# Patient Record
Sex: MALE | Race: ASIAN | NOT HISPANIC OR LATINO | Employment: FULL TIME | URBAN - METROPOLITAN AREA
[De-identification: names, ages, dates, MRNs, and addresses within clinical notes are randomized per-mention and may not be internally consistent; named-entity substitution may affect disease eponyms.]

---

## 2021-05-29 ENCOUNTER — RECORDS - HEALTHEAST (OUTPATIENT)
Dept: ADMINISTRATIVE | Facility: CLINIC | Age: 36
End: 2021-05-29

## 2023-11-09 ENCOUNTER — OFFICE VISIT (OUTPATIENT)
Dept: FAMILY MEDICINE | Facility: CLINIC | Age: 38
End: 2023-11-09
Payer: COMMERCIAL

## 2023-11-09 VITALS
WEIGHT: 147.56 LBS | SYSTOLIC BLOOD PRESSURE: 98 MMHG | BODY MASS INDEX: 27.15 KG/M2 | HEIGHT: 62 IN | DIASTOLIC BLOOD PRESSURE: 68 MMHG | HEART RATE: 80 BPM | OXYGEN SATURATION: 96 % | TEMPERATURE: 97.5 F

## 2023-11-09 DIAGNOSIS — Z71.84 TRAVEL ADVICE ENCOUNTER: Primary | ICD-10-CM

## 2023-11-09 PROCEDURE — 99401 PREV MED CNSL INDIV APPRX 15: CPT | Mod: 25 | Performed by: NURSE PRACTITIONER

## 2023-11-09 PROCEDURE — 90471 IMMUNIZATION ADMIN: CPT | Mod: GA | Performed by: NURSE PRACTITIONER

## 2023-11-09 PROCEDURE — 90716 VAR VACCINE LIVE SUBQ: CPT | Mod: GA | Performed by: NURSE PRACTITIONER

## 2023-11-09 PROCEDURE — 90632 HEPA VACCINE ADULT IM: CPT | Mod: GA | Performed by: NURSE PRACTITIONER

## 2023-11-09 PROCEDURE — 90472 IMMUNIZATION ADMIN EACH ADD: CPT | Mod: GA | Performed by: NURSE PRACTITIONER

## 2023-11-09 PROCEDURE — 90691 TYPHOID VACCINE IM: CPT | Mod: GA | Performed by: NURSE PRACTITIONER

## 2023-11-09 PROCEDURE — 90715 TDAP VACCINE 7 YRS/> IM: CPT | Mod: GA | Performed by: NURSE PRACTITIONER

## 2023-11-09 RX ORDER — AZITHROMYCIN 500 MG/1
500 TABLET, FILM COATED ORAL DAILY
Qty: 3 TABLET | Refills: 0 | Status: SHIPPED | OUTPATIENT
Start: 2023-11-09 | End: 2023-11-12

## 2023-11-09 RX ORDER — ATOVAQUONE AND PROGUANIL HYDROCHLORIDE 250; 100 MG/1; MG/1
1 TABLET, FILM COATED ORAL DAILY
Qty: 35 TABLET | Refills: 0 | Status: SHIPPED | OUTPATIENT
Start: 2023-11-09 | End: 2024-03-01

## 2023-11-09 NOTE — PROGRESS NOTES
"Nurse Note ( Pre-Travel Consult)    Itinerary:  [place] and [place]    Departure Date: 11/14/23    Return Date: 12/13/23    Length of Trip 1 month    Reason for Travel: Visiting friends and relatives         Urban or rural: both    Accommodations: Hotel  Family home        IMMUNIZATION HISTORY  Have you received any immunizations within the past 4 weeks?  No  Have you ever fainted from having your blood drawn or from an injection?  No  Have you ever had a fever reaction to vaccination?  No  Have you ever had any bad reaction or side effect from any vaccination?  No  Have you ever had hepatitis A or B vaccine?  No  Do you live (or work closely) with anyone who has AIDS, an AIDS-like condition, any other immune disorder or who is on chemotherapy for cancer?  No  Do you have a family history of immunodeficiency?  No  Have you received any injection of immune globulin or any blood products during the past 12 months?  No    Patient roomed by Rodolfo Hernandez  Mick Mansfield is a 38 year old male seen today with family members for counsultation for international travel.   Patient will be departing in  5 day(s) and  traveling with family member(s).  of 5     Patient itinerary :  will be in the [facility] for 2 days till 11/19 >  [facility] ([place])  > [facility] on 12/11/2023 region of [facility] which risk for Malaria, Dengue Fever, Rabies, food borne illnesses, motor vehicle accidents, and Typhoid. exposure.      Patient's activities will include visiting friends and relatives.    Patient's country of birth is [facility]    To [place] in 1997  Special medical concerns: none  Pre-travel questionnaire was completed by patient and reviewed by provider.     Vitals: BP 98/68   Pulse 80   Temp 97.5  F (36.4  C) (Temporal)   Ht 1.562 m (5' 1.5\")   Wt 66.9 kg (147 lb 9 oz)   SpO2 96%   BMI 27.43 kg/m    BMI= Body mass index is 27.43 kg/m .    EXAM:  General:  Well-nourished, well-developed in no acute distress.  Appears to be " stated age, interacts appropriately and expresses understanding of information given to patient.    Current Outpatient Medications   Medication Sig Dispense Refill    atovaquone-proguanil (MALARONE) 250-100 MG tablet Take 1 tablet by mouth daily Start 2 days before exposure to Malaria and continue daily till  7 days after exposure. 35 tablet 0    azithromycin (ZITHROMAX) 500 MG tablet Take 1 tablet (500 mg) by mouth daily for 3 doses Take 1 tablet a day for up to 3 days for severe diarrhea 3 tablet 0     There is no problem list on file for this patient.    No Known Allergies      Immunizations discussed include:   Covid 19: Up to date and Declined  booster  Hepatitis A:  Ordered/given today, risks, benefits and side effects reviewed  Hepatitis B: immune  Influenza: Declined  Not concerned about risk of disease  Typhoid: Ordered/given today, risks, benefits and side effects reviewed  Rabies: Declined  reviewed managment of a animal bite or scratch (washing wound, seek medical care within 24 hours for post exposure prophylaxis ) and Insufficient time to vaccinate  Yellow Fever: Not indicated  Japanese Encephalitis: Not indicated - risk of disease is minimal insufficient time  Meningococcus: Not indicated  Tetanus/Diphtheria: Ordered/given today, risks, benefits and side effects reviewed  Measles/Mumps/Rubella: Up to date  Cholera: Not needed  Polio: Up to date  Pneumococcal: Under age of 65  Varicella: Ordered/given today, risks, benefits and side effects reviewed  Shingrix: Not indicated  HPV:  Not indicated     TB: low risk     Altitude Exposure on this trip: no  Past tolerance to Altitude: na    ASSESSMENT/PLAN:  Diagnoses and all orders for this visit:    Travel advice encounter  -     atovaquone-proguanil (MALARONE) 250-100 MG tablet; Take 1 tablet by mouth daily Start 2 days before exposure to Malaria and continue daily till  7 days after exposure.  -     azithromycin (ZITHROMAX) 500 MG tablet; Take 1 tablet  (500 mg) by mouth daily for 3 doses Take 1 tablet a day for up to 3 days for severe diarrhea    Other orders  -     HEPATITIS A 19+ (HAVRIX/VAQTA)  -     TYPHOID VACCINE, IM  -     TDAP 7+ (ADACEL,BOOSTRIX)  -     VARICELLA LIVE (VARIVAX)      I have reviewed general recommendations for safe travel   including: food/water precautions, insect precautions,   roadway safety. Educational materials and [employer] report provided.    Malaraia prophylaxis recommended: Malarone  Symptomatic treatment for traveler's diarrhea: azithromycin      Evacuation insurance advised and resources were provided to patient.    Total visit time 20 minutes  with over 50% of time spent counseling patient and shared decision making as detailed above.    Nisha Beverly CNP  Certificate in Travel Health

## 2023-11-09 NOTE — PATIENT INSTRUCTIONS
Thank you for visiting the [facility] : [phone]  Today November 9, 2023 you received the    Hepatitis A Vaccine - Please return on 5/7/24 or later for your 2nd and final dose.    Tetanus (Tdap) Vaccine    Varicella (Chicken Pox) Vaccine    Typhoid - injectable. This vaccine is valid for two years.     Follow up vaccine appointments can be made as a NURSE ONLY visit at the Travel Clinic, (BE PREPARED TO WAIT, ) or at designated [facility].    If you are receiving the Rabies vaccines series, it is important that you follow the exact schedule ordered.     Pre-travel     We recommend that you purchase Medical Evacuation Insurance prior to your departure.  Https://wwwnc.cdc.gov/travel/page/insurance    [place] your travel plans with the [employer] Department of State through STEP ( Smart Traveler Enrollment Program ) https://step.state.gov.  STEP is a free service to allow [place] citizens and nationals traveling and living abroad to enroll their trip with the nearest [place] Embassy or Consulate.    Animal Exposure: Avoid all mammals even if they look healthy.  If there is a bite, scratch or even a lick, wash area immediately with soap and water for 15 minutes and seek medical care within 24 hours for evaluation of Rabies post exposure treatment.  Contact your Medical Evacuation Insurance.    Repiratory illness prevention strategies ( Covid and Influenza ) CDC recommendations:  Consider wearing a mask in crowded or poorly ventilated indoor areas, including on public transportation and in transportation hubs.  Hand washing: frequent, thorough handwashing with soap and water for 20 seconds. Use an alcohol-based hand  with at least 60% alcohol if soap and water are not readily available. Avoid touching face, nose, eyes, mouth unless you have done appropriate hand washing as above.  VACCINES: Staying up to date on COVID-19 vaccines significantly lowers the risk of getting  very sick, being hospitalized, or dying from COVID-19. CDC recommends that everyone stay up to date on their COVID-19 vaccines, especially people with weakened immune systems.    Travel Covid 19 Testing:  updated 12/06/2021  International travelers: Pre-travel:  See country specific Embassy websites or airline websites.    Post travel: CDC recommends getting tested 3-5 days after your trip     Post-travel illness:  Contact your provider or [facility] if you develop a fever, rash, cough, diarrhea or other symptoms for up to 1 year after travel.  Inform your healthcare provider when and where you traveled to.    Please call the [facility] with any questions [phone]  Or send your provider a 'My Chart' note.

## 2023-11-25 ENCOUNTER — HEALTH MAINTENANCE LETTER (OUTPATIENT)
Age: 38
End: 2023-11-25

## 2024-02-16 ENCOUNTER — HOSPITAL ENCOUNTER (EMERGENCY)
Facility: HOSPITAL | Age: 39
Discharge: HOME | End: 2024-02-16
Attending: EMERGENCY MEDICINE | Admitting: EMERGENCY MEDICINE
Payer: COMMERCIAL

## 2024-02-16 ENCOUNTER — APPOINTMENT (OUTPATIENT)
Dept: CT IMAGING | Facility: HOSPITAL | Age: 39
End: 2024-02-16
Attending: EMERGENCY MEDICINE
Payer: COMMERCIAL

## 2024-02-16 ENCOUNTER — OFFICE VISIT (OUTPATIENT)
Dept: FAMILY MEDICINE | Facility: CLINIC | Age: 39
End: 2024-02-16
Payer: COMMERCIAL

## 2024-02-16 VITALS
HEART RATE: 71 BPM | TEMPERATURE: 98.7 F | OXYGEN SATURATION: 98 % | BODY MASS INDEX: 27.75 KG/M2 | RESPIRATION RATE: 16 BRPM | DIASTOLIC BLOOD PRESSURE: 74 MMHG | SYSTOLIC BLOOD PRESSURE: 114 MMHG | WEIGHT: 149.3 LBS

## 2024-02-16 VITALS
SYSTOLIC BLOOD PRESSURE: 122 MMHG | OXYGEN SATURATION: 99 % | HEART RATE: 72 BPM | RESPIRATION RATE: 17 BRPM | DIASTOLIC BLOOD PRESSURE: 84 MMHG | TEMPERATURE: 96.7 F | HEIGHT: 62 IN | BODY MASS INDEX: 27.42 KG/M2 | WEIGHT: 149 LBS

## 2024-02-16 DIAGNOSIS — R42 DIZZINESS: ICD-10-CM

## 2024-02-16 DIAGNOSIS — R10.13 EPIGASTRIC PAIN: Primary | ICD-10-CM

## 2024-02-16 DIAGNOSIS — R42 LIGHT HEADEDNESS: ICD-10-CM

## 2024-02-16 DIAGNOSIS — R42 FEELING FAINT: ICD-10-CM

## 2024-02-16 LAB
ALBUMIN SERPL BCG-MCNC: 4.5 G/DL (ref 3.5–5.2)
ALP SERPL-CCNC: 72 U/L (ref 40–150)
ALT SERPL W P-5'-P-CCNC: 103 U/L (ref 0–70)
ANION GAP SERPL CALCULATED.3IONS-SCNC: 11 MMOL/L (ref 7–15)
AST SERPL W P-5'-P-CCNC: 44 U/L (ref 0–45)
BASOPHILS # BLD AUTO: 0.1 10E3/UL (ref 0–0.2)
BASOPHILS NFR BLD AUTO: 1 %
BILIRUB SERPL-MCNC: 0.3 MG/DL
BUN SERPL-MCNC: 15.4 MG/DL (ref 6–20)
CALCIUM SERPL-MCNC: 9 MG/DL (ref 8.6–10)
CHLORIDE SERPL-SCNC: 103 MMOL/L (ref 98–107)
CREAT SERPL-MCNC: 0.91 MG/DL (ref 0.67–1.17)
DEPRECATED HCO3 PLAS-SCNC: 27 MMOL/L (ref 22–29)
EGFRCR SERPLBLD CKD-EPI 2021: >90 ML/MIN/1.73M2
EOSINOPHIL # BLD AUTO: 0.2 10E3/UL (ref 0–0.7)
EOSINOPHIL NFR BLD AUTO: 2 %
ERYTHROCYTE [DISTWIDTH] IN BLOOD BY AUTOMATED COUNT: 13.2 % (ref 10–15)
FLUAV RNA SPEC QL NAA+PROBE: NEGATIVE
FLUBV RNA RESP QL NAA+PROBE: NEGATIVE
GLUCOSE SERPL-MCNC: 87 MG/DL (ref 70–99)
GROUP A STREP BY PCR: NOT DETECTED
HCT VFR BLD AUTO: 47.8 % (ref 40–53)
HGB BLD-MCNC: 15.7 G/DL (ref 13.3–17.7)
HOLD SPECIMEN: NORMAL
HOLD SPECIMEN: NORMAL
IMM GRANULOCYTES # BLD: 0.1 10E3/UL
IMM GRANULOCYTES NFR BLD: 1 %
LIPASE SERPL-CCNC: 49 U/L (ref 13–60)
LYMPHOCYTES # BLD AUTO: 4.6 10E3/UL (ref 0.8–5.3)
LYMPHOCYTES NFR BLD AUTO: 47 %
MAGNESIUM SERPL-MCNC: 2.3 MG/DL (ref 1.7–2.3)
MCH RBC QN AUTO: 28.4 PG (ref 26.5–33)
MCHC RBC AUTO-ENTMCNC: 32.8 G/DL (ref 31.5–36.5)
MCV RBC AUTO: 87 FL (ref 78–100)
MONOCYTES # BLD AUTO: 0.7 10E3/UL (ref 0–1.3)
MONOCYTES NFR BLD AUTO: 7 %
NEUTROPHILS # BLD AUTO: 4.1 10E3/UL (ref 1.6–8.3)
NEUTROPHILS NFR BLD AUTO: 42 %
NRBC # BLD AUTO: 0 10E3/UL
NRBC BLD AUTO-RTO: 0 /100
PLATELET # BLD AUTO: 170 10E3/UL (ref 150–450)
POTASSIUM SERPL-SCNC: 3.8 MMOL/L (ref 3.4–5.3)
PROT SERPL-MCNC: 7.8 G/DL (ref 6.4–8.3)
RBC # BLD AUTO: 5.52 10E6/UL (ref 4.4–5.9)
RSV RNA SPEC NAA+PROBE: NEGATIVE
SARS-COV-2 RNA RESP QL NAA+PROBE: NEGATIVE
SODIUM SERPL-SCNC: 141 MMOL/L (ref 135–145)
TROPONIN T SERPL HS-MCNC: <6 NG/L
TSH SERPL DL<=0.005 MIU/L-ACNC: 1.72 UIU/ML (ref 0.3–4.2)
WBC # BLD AUTO: 9.7 10E3/UL (ref 4–11)

## 2024-02-16 PROCEDURE — 83690 ASSAY OF LIPASE: CPT | Performed by: EMERGENCY MEDICINE

## 2024-02-16 PROCEDURE — 70491 CT SOFT TISSUE NECK W/DYE: CPT

## 2024-02-16 PROCEDURE — 87637 SARSCOV2&INF A&B&RSV AMP PRB: CPT | Performed by: EMERGENCY MEDICINE

## 2024-02-16 PROCEDURE — 96361 HYDRATE IV INFUSION ADD-ON: CPT

## 2024-02-16 PROCEDURE — 250N000011 HC RX IP 250 OP 636: Performed by: EMERGENCY MEDICINE

## 2024-02-16 PROCEDURE — 93005 ELECTROCARDIOGRAM TRACING: CPT | Performed by: EMERGENCY MEDICINE

## 2024-02-16 PROCEDURE — 250N000013 HC RX MED GY IP 250 OP 250 PS 637: Performed by: EMERGENCY MEDICINE

## 2024-02-16 PROCEDURE — 84443 ASSAY THYROID STIM HORMONE: CPT | Performed by: EMERGENCY MEDICINE

## 2024-02-16 PROCEDURE — 36415 COLL VENOUS BLD VENIPUNCTURE: CPT | Performed by: EMERGENCY MEDICINE

## 2024-02-16 PROCEDURE — 83735 ASSAY OF MAGNESIUM: CPT | Performed by: EMERGENCY MEDICINE

## 2024-02-16 PROCEDURE — 84484 ASSAY OF TROPONIN QUANT: CPT | Performed by: EMERGENCY MEDICINE

## 2024-02-16 PROCEDURE — 71275 CT ANGIOGRAPHY CHEST: CPT

## 2024-02-16 PROCEDURE — 258N000003 HC RX IP 258 OP 636: Performed by: EMERGENCY MEDICINE

## 2024-02-16 PROCEDURE — 80053 COMPREHEN METABOLIC PANEL: CPT | Performed by: EMERGENCY MEDICINE

## 2024-02-16 PROCEDURE — 99214 OFFICE O/P EST MOD 30 MIN: CPT | Performed by: PHYSICIAN ASSISTANT

## 2024-02-16 PROCEDURE — 87651 STREP A DNA AMP PROBE: CPT | Performed by: EMERGENCY MEDICINE

## 2024-02-16 PROCEDURE — 99285 EMERGENCY DEPT VISIT HI MDM: CPT | Mod: 25

## 2024-02-16 PROCEDURE — 85025 COMPLETE CBC W/AUTO DIFF WBC: CPT | Performed by: EMERGENCY MEDICINE

## 2024-02-16 PROCEDURE — 96374 THER/PROPH/DIAG INJ IV PUSH: CPT | Mod: 59

## 2024-02-16 RX ORDER — ONDANSETRON 2 MG/ML
4 INJECTION INTRAMUSCULAR; INTRAVENOUS ONCE
Status: COMPLETED | OUTPATIENT
Start: 2024-02-16 | End: 2024-02-16

## 2024-02-16 RX ORDER — POLYVINYL ALCOHOL 14 MG/ML
1 SOLUTION/ DROPS OPHTHALMIC PRN
Qty: 30 ML | Refills: 0 | Status: SHIPPED | OUTPATIENT
Start: 2024-02-16 | End: 2024-03-01

## 2024-02-16 RX ORDER — MECLIZINE HCL 12.5 MG 12.5 MG/1
25 TABLET ORAL ONCE
Status: COMPLETED | OUTPATIENT
Start: 2024-02-16 | End: 2024-02-16

## 2024-02-16 RX ORDER — IOPAMIDOL 755 MG/ML
90 INJECTION, SOLUTION INTRAVASCULAR ONCE
Status: COMPLETED | OUTPATIENT
Start: 2024-02-16 | End: 2024-02-16

## 2024-02-16 RX ORDER — MECLIZINE HYDROCHLORIDE 25 MG/1
25 TABLET ORAL 3 TIMES DAILY PRN
Qty: 20 TABLET | Refills: 0 | Status: SHIPPED | OUTPATIENT
Start: 2024-02-16 | End: 2024-03-01

## 2024-02-16 RX ADMIN — MECLIZINE HYDROCHLORIDE 25 MG: 12.5 TABLET ORAL at 20:09

## 2024-02-16 RX ADMIN — ONDANSETRON 4 MG: 2 INJECTION INTRAMUSCULAR; INTRAVENOUS at 20:09

## 2024-02-16 RX ADMIN — IOPAMIDOL 90 ML: 755 INJECTION, SOLUTION INTRAVENOUS at 20:38

## 2024-02-16 RX ADMIN — SODIUM CHLORIDE 1000 ML: 9 INJECTION, SOLUTION INTRAVENOUS at 18:31

## 2024-02-16 ASSESSMENT — ACTIVITIES OF DAILY LIVING (ADL)
ADLS_ACUITY_SCORE: 35
ADLS_ACUITY_SCORE: 35

## 2024-02-16 NOTE — ED TRIAGE NOTES
Patient arrives for evaluation of dizziness. States that he has had about 3-4 weeks of dizziness. States that it seems to be worse after he eats. Reports that when he experiences episodes of dizziness he feels very weak and tired. Was seen in clinic prior to ED, and was told by provider to go to ED for EKG and blood work. Patient also states that provider mentioned that eyes were yellow in color which was also concerning.

## 2024-02-16 NOTE — PROGRESS NOTES
"Patient presents with:  Dizziness: Constant dizziness and lightheaded for last 3 week. Blurry vision. Some vertigo. Head heavy. Slight nausea. Swollen gland. Syncope every time pt eats. Lt eye turn red yesterday. Nose bleed Rt nostril last 2 day.    (R10.13) Epigastric pain  (primary encounter diagnosis)    (R42) Dizziness  Comment: intermittent and with head movement    (R42) Feeling faint  Comment: as if he is going to have a syncopal episode every time he eats.    Plan: to ED now for further evaluation.     We do not have the capacity to get stat comprehensive metabolic panel's here in urgent care to appropriately assess this patient today.  I have referred him to the emergency department.  His wife will drive him.  I contacted the ED to let them know he was on his way.      At the end of the encounter, I discussed results, diagnosis, medications. Discussed red flags for immediate return to clinic/ER, as well as indications for follow up if no improvement. Patient understood and agreed to plan. Patient was stable for discharge         SUBJECTIVE:   Mick Mansfield is a 38 year old male who presents today with multiple issues:  1) Left eye red and crusting for the past 3 days.   2) some blurred vision for the past few months.   3) slight cough worse at night.    4) feels like he is going to black out every time he eats something for the past 3 weeks like his head is really heavy.  He denies any chest pain shortness of breath or palpitations.  He denies any pain with swallowing saliva.  He denies any pain when he is eating, only complains of the feeling like he is going to \"blackout\"  5) both eyes seem yellow for the past month or so. He does drink alcohol, but only 4 beers or so in a weekend.       No abdominal pain or back pain.      Last month he had some epigastric pain that resolved with pepto bismal and pepcid.  He felt like he had air in his stomach and was very bloated but that resolved.      No urinary " symptoms.       He is here with his wife today who helps with the HPI.      Current Outpatient Medications   Medication Sig Dispense Refill    Multiple Vitamins-Iron (DAILY-JOSE/IRON/BETA-CAROTENE) TABS TAKE 1 TABLET BY MOUTH DAILY. (Patient not taking: Reported on 10/19/2020) 30 tablet 7     Social History     Tobacco Use    Smoking status: Never Smoker    Smokeless tobacco: Never Used   Substance Use Topics    Alcohol use: Not on file     Family History   Problem Relation Age of Onset    Diabetes Mother     Diabetes Father          ROS:    10 point ROS of systems including Constitutional, Eyes, Respiratory, Cardiovascular, Gastroenterology, Genitourinary, Integumentary, Muscularskeletal, Psychiatric ,neurological were all negative except for pertinent positives noted in my HPI       OBJECTIVE:  /74   Pulse 71   Temp 98.7  F (37.1  C) (Oral)   Resp 16   Wt 67.7 kg (149 lb 4.8 oz)   SpO2 98%   BMI 27.75 kg/m    Physical Exam:  GENERAL APPEARANCE: healthy, alert and no distress  EYES: EOMI,  PERRL, conjunctiva clear  EYES: left conjunctiva is mildly injected.  Both conjunctivae are mildly icteric  HENT: ear canals and TM's normal.  Nose and mouth without ulcers, erythema or lesions. With boggy blue turbinates and whitish coryza  NECK: supple, nontender, no lymphadenopathy  RESP: lungs clear to auscultation - no rales, rhonchi or wheezes  CV: regular rates and rhythm, normal S1 S2, no murmur noted  ABDOMEN:  soft, nontender, no HSM or masses and bowel sounds normal  NEURO: Normal strength and tone, sensory exam grossly normal,  normal speech and mentation  SKIN: no suspicious lesions or rashes

## 2024-02-16 NOTE — PATIENT INSTRUCTIONS
(R10.13) Epigastric pain  (primary encounter diagnosis)    (R42) Dizziness  Comment: intermittent and with head movement    (R42) Feeling faint  Comment: as if he is going to have a syncopal episode every time he eats.    Plan: to ED now for further evaluation.

## 2024-02-17 LAB
ATRIAL RATE - MUSE: 70 BPM
DIASTOLIC BLOOD PRESSURE - MUSE: NORMAL MMHG
INTERPRETATION ECG - MUSE: NORMAL
P AXIS - MUSE: 63 DEGREES
PR INTERVAL - MUSE: 170 MS
QRS DURATION - MUSE: 88 MS
QT - MUSE: 372 MS
QTC - MUSE: 401 MS
R AXIS - MUSE: -2 DEGREES
SYSTOLIC BLOOD PRESSURE - MUSE: NORMAL MMHG
T AXIS - MUSE: 8 DEGREES
VENTRICULAR RATE- MUSE: 70 BPM

## 2024-02-17 NOTE — DISCHARGE INSTRUCTIONS
Follow-up with your primary care doctor if not improving.  Can take the Antivert as needed when you are feeling dizzy however I do feel this is more likely to be lightheadedness with this may not significantly improve your symptoms but you can try this.  Stay well-hydrated.  As we discussed can try Gatorade to stay well-hydrated.  Can try the artificial tears to keep your dry eyes more comfortable.  Also can use warm compresses on the eyes to stimulate the glands to help keep your eyes hydrated.  Can do the warm compresses 4 times a day.    Return to the ER for any new or worsening concerns.

## 2024-02-17 NOTE — ED PROVIDER NOTES
EMERGENCY DEPARTMENT ENCOUNTER      NAME: Mick Mansfield  AGE: 38 year old male  YOB: 1985  MRN: [id number]  EVALUATION DATE & TIME: No admission date for patient encounter.    PCP: No Ref-Primary, Physician    ED PROVIDER: Jessie Vega MD      Chief Complaint   Patient presents with    Dizziness         FINAL IMPRESSION:  1. Light headedness          ED COURSE & MEDICAL DECISION MAKING:    Pertinent Labs & Imaging studies reviewed. (See chart for details)    6:50 PM I introduced myself to the patient, obtained patient history, performed a physical exam, and discussed plan for ED workup including potential diagnostic laboratory/imaging studies and interventions.  9:50 PM I rechecked and updated the patient. He underwent an ambulatory trial. We discussed the plan for discharge and the patient is agreeable. Reviewed supportive cares, symptomatic treatment, outpatient follow up, and reasons to return to the Emergency Department. Patient to be discharged by ED RN.      38 year old male presents to the Emergency Department for evaluation of lightheadedness/dizziness.  This has been ongoing now for 3 weeks.  It also sounds like he has had some upper respiratory symptoms with this.  Has multiple symptoms noted as below.  No chest pain or shortness of breath however.  Also had recent long travel to [facility] in December and drove a car to [place] a couple weeks ago.  His children have also been sick with upper respiratory illness at home.  Somewhat difficult to tie all of his symptoms together.  Overall on exam he is well-appearing and in no acute distress.  No signs of respiratory distress.  He has no focal neurologic deficits or signs of cerebellar dysfunction on exam.  He has a hard time describing his symptoms but states mostly it feels like he is going to pass out.  May be at times if he turns his head too quickly or stands up too quickly he will get a little room spinning also.  States that he has been  feeling very fatigued and like his head is heavy when he eats.  Not specifically passing out when eating or anything like that.  When I press him further he really states that it is actually he feels like his head is heavy while he eats.  Vague symptoms somewhat difficult to totally pin down.  However with the recent long travel did consider PE.  He is also reportedly had some chronic neck gland swelling per his wife that is been ongoing for months that he has not been evaluated for.  I can palpate some cervical lymphadenopathy.  Concerning for potential mass or whether this could also just be reactive if he does have a possible viral illness with his multiple exposures and other symptoms.  Overall felt that CVA would be very unlikely as this has been ongoing for 3 weeks and he has no focal deficits or cerebellar deficit on exam here and has been ambulating.  Also is not having any headache or focal deficit to suggest intracranial mass or hemorrhage at this point.  Also considered cardiac etiology or arrhythmia.  Again could be viral syndrome or possible strep pharyngitis with exposure at home.  No signs or symptoms of meningitis or encephalitis on exam.  No meningismus.    Had a long discussion with the patient and his wife about options for workup.  We discussed with the persistence of this over 3 weeks the best imaging study of the head to fully rule things out would be an MRI of the brain and MRA of the head and neck.  At this point they declined this and would prefer to follow through with further workup and symptomatic treatment here and reassess.  Did overall feel that intracranial pathology was less likely with his description.  Will obtain CT soft tissue neck with the lymphadenopathy as well as CT of the chest to rule out PE.  They were comfortable with this plan and laboratory studies.  He was given a liter of IV fluids as well as 4 mg of IV Zofran.  Although this does not sound specifically like vertigo  for most of the time he is experiencing this we did attempt 25 mg of oral meclizine as well to see if this improved his symptoms.  Vital signs here are within normal limits.    Influenza COVID-19 and RSV PCR is negative.  Also possible this could be a post-COVID syndrome since they have had a lot of respiratory disease going around her house.  Group A strep is negative.  TSH within normal limits.  Magnesium within normal limits.  Lipase within normal limits.  Troponin less than 6 and EKG was obtained that does not reveal any evidence of acute ischemia.  This reveals sinus rhythm with no sign of arrhythmia on monitoring here as well.  No AV block.  No sign of WPW, Brugada syndrome, or LVH.  White blood cell count within normal limits at 9.7.  Hemoglobin 15.7.  No signs or symptoms of GI bleeding.  Has no abdominal tenderness or pain at this time.  CMP reveals normal electrolytes and normal creatinine.  Alk phos AST and bilirubin within normal limits.  ALT slightly elevated at 103 which is nonspecific.  Does drink alcohol on the weekends and advised avoiding doing this with his current symptoms.    CT soft tissue neck reveals a mild prominence of the cervical chain lymph nodes bilaterally and overall diffuse manner.  However none of these are pathologically enlarged by size criteria.  This prominence is potentially reactive in nature.  Clinical surveillance of this palpable lymphadenopathy to demonstrate stability or possibility dilution would be of benefit.  Did discuss continued follow-up with his primary care doctor to monitor this with his wife.  He does have a clinic he can go to.  CT of the chest reveals no PE.  No other acute pathology.  No pneumonia.  On reevaluation he is feeling improved.  Ambulated with him in the department here and he has no lightheadedness/dizziness and no ataxia and is feeling well.  Would like to be discharged at this time.  We again discussed potentially obtaining MRI of the brain  which he and his wife declined currently and were comfortable following up as an outpatient for this testing if not improving at home.  He is going to make an appointment and follow-up closely with his primary care doctor per wife.  He was given a prescription for meclizine to try at home although again I feel that vertigo is somewhat less likely but could be possible especially with recent viral illness.  He has this very mild injected conjunctiva bilaterally and does report dry eye history and staring at a computer screen for long periods of time so do wonder if potentially may have some blepharitis.  No significant sign of conjunctivitis and no vision changes here.  Has had chronic worsening vision over about a year that is unchanged.  Again advised close follow-up with his primary care doctor and prescribed artificial tears here to try first as well as warm compresses.  Did discuss if worsening would need reevaluation here in the ER.  They voiced understanding and were comfortable with this plan.  He has had no head trauma and again no focal deficits.  At this point discussed strict return precautions to the ER.  They voiced understanding and were comfortable with the plan of discharge.  He was discharged home in stable condition.  Advised again very important he follow-up with his primary care doctor as we do not know the exact cause of his symptoms.         At the conclusion of the encounter I discussed the results of all of the tests and the disposition. The questions were answered. The patient or family acknowledged understanding and was agreeable with the care plan.         Medical Decision Making  Obtained supplemental history:Supplemental history obtained?: Family Member/Significant Other  Reviewed external records: External records reviewed?: Outpatient Record: [facility] on 2/16/24  Care impacted by chronic illness:Hyperlipidemia  Care significantly affected by social  determinants of health:Access to Medical Care  Did you consider but not order tests?: In addition to work-up documented, I considered the following work up: MRI of the brain including MRA of the head and neck however patient declined this  Did you interpret images independently?: Independent interpretation of ECG and images noted in documentation, when applicable.  Consultation discussion with other provider:Did you involve another provider (consultant, , pharmacy, etc.)?: No  Discharge. I prescribed additional prescription strength medication(s) as charted. See documentation for any additional details.      MEDICATIONS GIVEN IN THE EMERGENCY:  Medications   sodium chloride 0.9% BOLUS 1,000 mL (0 mLs Intravenous Stopped 2/16/24 1948)   meclizine (ANTIVERT) tablet 25 mg (25 mg Oral $Given 2/16/24 2009)   ondansetron (ZOFRAN) injection 4 mg (4 mg Intravenous $Given 2/16/24 2009)   iopamidol (ISOVUE-370) solution 90 mL (90 mLs Intravenous $Given 2/16/24 2038)       NEW PRESCRIPTIONS STARTED AT TODAY'S ER VISIT  Discharge Medication List as of 2/16/2024 10:15 PM        START taking these medications    Details   meclizine (ANTIVERT) 25 MG tablet Take 1 tablet (25 mg) by mouth 3 times daily as needed for dizziness, Disp-20 tablet, R-0, E-Prescribe      polyvinyl alcohol (LIQUIFILM TEARS) 1.4 % ophthalmic solution Apply 1 drop to eye as needed for dry eyes, Disp-30 mL, R-0, E-Prescribe                =================================================================    HPI    Patient information was obtained from: Patient and patient's wife    Use of : N/A         Mick Mansfield is a 38 year old male with a pertinent history of hyperlipidemia (not on any medication) who presents to this ED for evaluation of light headedness/dizziness.    Patient reports constant dizziness/light headed, ongoing for 3 weeks. He reports at times it feels like room spinning but most of the time it is feeling like he may pass out and  worse when standing or moving. He says the light headedness gets worse when eating and he gets near syncopal. Patient denies any loss of conciousness or falls. He also notes head heaviness, fatigue, nausea, and cough that is worse at night that started at the same time. Patient has also had some redness to eyes, sore throat, and has been waking up with nosebleeds for the past couple days. He reports some bloating and acid reflux that is not new. Patient and wife endorses taking a road trip to [place] around 2 weeks ago, but say the symptoms started prior to that. They also endorse flying to [facility] in December. Patient has a history of high cholesterol but denies being on medications for it. Patient's wife endorses that they have kids at home who have been sick with strep and colds lately. Otherwise, patient denies any pain, melena, hematemesis, hematochezia,  rhinorrhea, shortness of breath, diarrhea, chest pain, vomiting, headache, leg swelling, abdominal pain, urinary problems, fevers, new vision changes, new ringing in ears, kids having mono at home, or any family or personal history of heart problems. He has had chronic neck gland swelling for the past couple of months per wife. Also has had blurry vision for 1 year that is unchanged. No numbness, tingling, focal weakness. No family history of cardiac disease or stroke. Has not been evaluated for the neck gland swelling per wife. No headaches. Drinks alcohol on the weekends but not daily. Swallowing normally, head just feels heavy when he tries to eat. No known cardiac disease. No family history of sudden death.  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  Diabetes, hypertension, or tobacco use.    Per chart review, patient was seen at [facility] on 2/16/24 for dizziness. Patient was sent to ED immediately for CMP and further evaluation.    REVIEW OF SYSTEMS   Review of Systems   Pertinent positives and negatives are  "documented in the HPI. All other systems reviewed and are negative.      PAST MEDICAL HISTORY:  History reviewed. No pertinent past medical history.    PAST SURGICAL HISTORY:  History reviewed. No pertinent surgical history.        CURRENT MEDICATIONS:    meclizine (ANTIVERT) 25 MG tablet  polyvinyl alcohol (LIQUIFILM TEARS) 1.4 % ophthalmic solution  atovaquone-proguanil (MALARONE) 250-100 MG tablet        ALLERGIES:  No Known Allergies    FAMILY HISTORY:  History reviewed. No pertinent family history.    SOCIAL HISTORY:   Social History     Socioeconomic History    Marital status: Single   Tobacco Use    Smoking status: Every Day     Types: Cigarettes    Smokeless tobacco: Never       VITALS:  /84   Pulse 72   Temp (!) 96.7  F (35.9  C)   Resp 17   Ht 1.575 m (5' 2\")   Wt 67.6 kg (149 lb)   SpO2 99%   BMI 27.25 kg/m      PHYSICAL EXAM    Physical Exam  Constitutional: Well developed, Well nourished, NAD, GCS 15  HENT: Normocephalic, Atraumatic, Bilateral external ears normal, possible slight bilateral effusions but no erythema or bulging of the TMs, hearing normal, oropharynx normal, uvula is midline and there is no posterior oropharynx swelling, no tonsillar exudate, tolerating secretions without difficulty, no trismus, voice is normal.  Mucous membranes moist, Nose normal. Neck-  Normal range of motion, No tenderness, Supple, No stridor.    Eyes: PERRL, EOMI, slightly injected conjunctiva bilaterally but very minimal, No discharge.  No surrounding periorbital swelling or erythema.  Vision grossly normal.  Normal color vision.  Respiratory: Normal breath sounds, No respiratory distress, No wheezing or crackles, Speaks in full sentences easily.    Cardiovascular: Normal heart rate, Regular rhythm, No murmurs, No rubs, No gallops. 2+ radial pulses bilaterally.   GI: Bowel sounds normal, Soft, No tenderness, No masses, No rebound or guarding.   Musculoskeletal: 2+ DP pulses. No notable lower extremity " edema.  No cyanosis, No clubbing. Good range of motion in all major joints. No tenderness to palpation or major deformities noted. No tenderness of the CTLS spine.    Integument: Warm, Dry, No erythema, No rash. No petechiae.    Lymphatic: Possible slight lymphadenopathy of the cervical chains bilaterally.  Neurologic: Alert & oriented x 3, 5/5 strength in all 4 extremities bilaterally. Sensation intact to light touch in all 4 extremities and the face bilaterally. No focal deficits noted. Normal gait. CN 2-12 intact bilaterally. Vision feeling normal.  Finger-to-nose intact bilaterally.  No ataxia.  No pronator drift.  Psychiatric: Affect normal, Judgment normal, Mood normal. Cooperative.      LAB:  All pertinent labs reviewed and interpreted.  Results for orders placed or performed during the hospital encounter of 02/16/24   CT Chest Pulmonary Embolism w Contrast    Impression    IMPRESSION:  1.  No pulmonary embolism.   Soft tissue neck CT w contrast    Impression    IMPRESSION:   1.  There is a mild prominence of the cervical chain lymph nodes bilaterally in an overall diffuse manner. However, none of these are pathologically enlarged by size criteria. This prominence is potentially reactive in nature. Clinical surveillance of   this palpable lymphadenopathy to demonstrate stability or possible resolution would be of benefit.    Comprehensive metabolic panel   Result Value Ref Range    Sodium 141 135 - 145 mmol/L    Potassium 3.8 3.4 - 5.3 mmol/L    Carbon Dioxide (CO2) 27 22 - 29 mmol/L    Anion Gap 11 7 - 15 mmol/L    Urea Nitrogen 15.4 6.0 - 20.0 mg/dL    Creatinine 0.91 0.67 - 1.17 mg/dL    GFR Estimate >90 >60 mL/min/1.73m2    Calcium 9.0 8.6 - 10.0 mg/dL    Chloride 103 98 - 107 mmol/L    Glucose 87 70 - 99 mg/dL    Alkaline Phosphatase 72 40 - 150 U/L    AST 44 0 - 45 U/L     (H) 0 - 70 U/L    Protein Total 7.8 6.4 - 8.3 g/dL    Albumin 4.5 3.5 - 5.2 g/dL    Bilirubin Total 0.3 <=1.2 mg/dL   Result  Value Ref Range    Magnesium 2.3 1.7 - 2.3 mg/dL   Result Value Ref Range    Lipase 49 13 - 60 U/L   TSH with free T4 reflex   Result Value Ref Range    TSH 1.72 0.30 - 4.20 uIU/mL   Result Value Ref Range    Troponin T, High Sensitivity <6 <=22 ng/L   CBC with platelets and differential   Result Value Ref Range    WBC Count 9.7 4.0 - 11.0 10e3/uL    RBC Count 5.52 4.40 - 5.90 10e6/uL    Hemoglobin 15.7 13.3 - 17.7 g/dL    Hematocrit 47.8 40.0 - 53.0 %    MCV 87 78 - 100 fL    MCH 28.4 26.5 - 33.0 pg    MCHC 32.8 31.5 - 36.5 g/dL    RDW 13.2 10.0 - 15.0 %    Platelet Count 170 150 - 450 10e3/uL    % Neutrophils 42 %    % Lymphocytes 47 %    % Monocytes 7 %    % Eosinophils 2 %    % Basophils 1 %    % Immature Granulocytes 1 %    NRBCs per 100 WBC 0 <1 /100    Absolute Neutrophils 4.1 1.6 - 8.3 10e3/uL    Absolute Lymphocytes 4.6 0.8 - 5.3 10e3/uL    Absolute Monocytes 0.7 0.0 - 1.3 10e3/uL    Absolute Eosinophils 0.2 0.0 - 0.7 10e3/uL    Absolute Basophils 0.1 0.0 - 0.2 10e3/uL    Absolute Immature Granulocytes 0.1 <=0.4 10e3/uL    Absolute NRBCs 0.0 10e3/uL   Extra Blue Top Tube   Result Value Ref Range    Hold Specimen JIC    Extra Red Top Tube   Result Value Ref Range    Hold Specimen JI    Symptomatic Influenza A/B, RSV, & SARS-CoV2 PCR (COVID-19) Nasopharyngeal    Specimen: Nasopharyngeal; Swab   Result Value Ref Range    Influenza A PCR Negative Negative    Influenza B PCR Negative Negative    RSV PCR Negative Negative    SARS CoV2 PCR Negative Negative   ECG 12-LEAD WITH MUSE (LHE)   Result Value Ref Range    Systolic Blood Pressure  mmHg    Diastolic Blood Pressure  mmHg    Ventricular Rate 70 BPM    Atrial Rate 70 BPM    VT Interval 170 ms    QRS Duration 88 ms     ms    QTc 401 ms    P Axis 63 degrees    R AXIS -2 degrees    T Axis 8 degrees    Interpretation ECG       Sinus rhythm  Normal ECG  No previous ECGs available  Confirmed by SEE ED PROVIDER NOTE FOR, ECG INTERPRETATION ([id number]),   ALYSHARAYNAKIRAN ([id number]) on 2/17/2024 12:23:52 AM     Group A Streptococcus PCR Throat Swab    Specimen: Throat; Swab   Result Value Ref Range    Group A strep by PCR Not Detected Not Detected       RADIOLOGY:  Reviewed all pertinent imaging. Please see official radiology report.  Soft tissue neck CT w contrast   Final Result   IMPRESSION:    1.  There is a mild prominence of the cervical chain lymph nodes bilaterally in an overall diffuse manner. However, none of these are pathologically enlarged by size criteria. This prominence is potentially reactive in nature. Clinical surveillance of    this palpable lymphadenopathy to demonstrate stability or possible resolution would be of benefit.       CT Chest Pulmonary Embolism w Contrast   Final Result   IMPRESSION:   1.  No pulmonary embolism.          EKG:    Performed at: [facility] Emergency Department on 2/16/24 at 17:56:23    Impression: sinus rhythm. No evidence of acute ischemia. No sign of WPW, Brugada syndrome, or LVH.     Rate: 70  Rhythm: sinus rhythm  Axis: -2  SC Interval: 170  QRS Interval: 88  QTc Interval: 401  ST Changes: none  Comparison: no previous ECGs available    I have independently reviewed and interpreted the EKG(s) documented above.    PROCEDURES:   none      [facility] Documentation:   CMS Diagnoses:               I, Gissell Watkins, am serving as a scribe to document services personally performed by Jessie Vega MD based on my observation and the provider's statements to me. I, Jessie Vega MD, attest that Gissell Watkins is acting in a scribe capacity, has observed my performance of the services and has documented them in accordance with my direction.    Jessie Vega MD  [facility] EMERGENCY DEPARTMENT  [address] [place] [id number]  [phone]     Jessie Vega MD  02/19/24 [id number]

## 2024-03-01 ENCOUNTER — OFFICE VISIT (OUTPATIENT)
Dept: INTERNAL MEDICINE | Facility: CLINIC | Age: 39
End: 2024-03-01
Payer: COMMERCIAL

## 2024-03-01 VITALS
HEART RATE: 94 BPM | TEMPERATURE: 98 F | SYSTOLIC BLOOD PRESSURE: 110 MMHG | OXYGEN SATURATION: 98 % | RESPIRATION RATE: 16 BRPM | BODY MASS INDEX: 27.92 KG/M2 | DIASTOLIC BLOOD PRESSURE: 70 MMHG | WEIGHT: 151.7 LBS | HEIGHT: 62 IN

## 2024-03-01 DIAGNOSIS — R42 DIZZINESS: ICD-10-CM

## 2024-03-01 DIAGNOSIS — R10.13 EPIGASTRIC PAIN: Primary | ICD-10-CM

## 2024-03-01 DIAGNOSIS — E78.5 HYPERLIPIDEMIA WITH TARGET LDL LESS THAN 130: ICD-10-CM

## 2024-03-01 PROCEDURE — 99213 OFFICE O/P EST LOW 20 MIN: CPT | Performed by: INTERNAL MEDICINE

## 2024-03-01 NOTE — ASSESSMENT & PLAN NOTE
Unclear etiology to his dizziness.  Is interesting that he only notes that specifically when he is eating and not when he is up and around.  Of note, he drinks very little water or fluids throughout the day. CT PE, neck CT, labs and EKG in ED were all benign 2/16/24.   -Increase fluid intake to 64 to 80 ounces a day  -Some of these should be an electrolyte drink  -F/up 6-8 weeks

## 2024-03-01 NOTE — PATIENT INSTRUCTIONS
Drink more fluids, at least 64 ounces, 80 ounces is goal. Mix in electrolyte drinks for some this (gatorade, powerade, liquid IV powder).     Bring stool test for H pylori. Then start omeprazole 20 mg daily.

## 2024-03-01 NOTE — ASSESSMENT & PLAN NOTE
Patient describes a month or so of epigastric pain.  Does have slight tenderness to palpation in the area on exam.  Question whether there might be some gastritis.  -Will have him give us an H. pylori sample  -After submitting sample, start omeprazole 20 mg daily  -Follow-up in 6 to 8 weeks

## 2024-03-01 NOTE — PROGRESS NOTES
"  Assessment & Plan   Problem List Items Addressed This Visit          Nervous and Auditory    Epigastric pain - Primary     Patient describes a month or so of epigastric pain.  Does have slight tenderness to palpation in the area on exam.  Question whether there might be some gastritis.  -Will have him give us an H. pylori sample  -After submitting sample, start omeprazole 20 mg daily  -Follow-up in 6 to 8 weeks         Relevant Medications    omeprazole (PRILOSEC) 20 MG DR capsule    Other Relevant Orders    Helicobacter pylori Antigen Stool       Endocrine    Hyperlipidemia with target LDL less than 130     Wife also tells me that they had biometric screening recently that did show high cholesterol.  She will bring these results to next visit.            Other    Dizziness     Unclear etiology to his dizziness.  Is interesting that he only notes that specifically when he is eating and not when he is up and around.  Of note, he drinks very little water or fluids throughout the day. CT PE, neck CT, labs and EKG in ED were all benign 2/16/24.   -Increase fluid intake to 64 to 80 ounces a day  -Some of these should be an electrolyte drink  -F/up 6-8 weeks              Ordering of each unique test  Prescription drug management  I spent a total of 24 minutes on the day of the visit.   Time spent by me doing chart review, history and exam, documentation and further activities per the note     Nicotine/Tobacco Cessation  He reports that he has been smoking cigarettes. He has never used smokeless tobacco.  Nicotine/Tobacco Cessation Plan  Information offered: Patient not interested at this time      BMI  Estimated body mass index is 27.75 kg/m  as calculated from the following:    Height as of this encounter: 1.575 m (5' 2\").    Weight as of this encounter: 68.8 kg (151 lb 11.2 oz).   Weight management plan: Discussed healthy diet and exercise guidelines    FUTURE APPOINTMENTS:       - Follow-up visit in 2 months for " physical       Subjective   Mick is a 38 year old, presenting for the following health issues:  Ear Problem (Pain and swelling on both ears.) and Follow Up (Dizziness )        3/1/2024     2:39 PM   Additional Questions   Roomed by Jj DEL TORO   Accompanied by Wife and son     Patient is here with his wife.  They would like to discuss his intermittent swelling behind his ears as well as follow-up after he was in the emergency room on 2/16/24.  That day had initially presented to urgent care with a myriad of complaints including dizziness, lightheadedness, some upper respiratory symptoms and upper abdominal pain and was directed to the emergency room for more expedited workup.  While there he had very thorough workup that was largely unremarkable.  Labs including CBC, CBC, magnesium, lipase, TSH, troponin and viral swabs were all normal or negative aside from elevated ALT of 103.  CT PE was negative for PE or any acute findings in his chest.  He also got a CT of his neck which did show mild prominence of the cervical chain lymph nodes bilaterally and an overall diffuse manner, none were pathologically enlarged, thought to be potentially reactive in nature.  Radiologist noted clinical surveillance of the palpable lymphadenopathy to demonstrate stability or resolution would be of benefit/recommended.    Today, he tells me he has had the intermittent swelling behind his ears for 2 years.  Notices it swells when he has less sleep or an upper respiratory illness and then gets better after that.  Since he was in the emergency room, he does think the swelling has improved.  If painful, Tylenol helps.    Still having dizziness and lightheadedness, specifically when he is eating or drinking. No falls. Only drinks 24 ounces of water most days. Wife tells me that they had biometric testing done recently that showed pre-diabetes and high cholesterol.     Also having intermittent upper abdominal discomfort. He says when he eats the  "food feels like it just sits in stomach. No sharp pain but does have discomfort when he presses in epigastric region.       Review of Systems  Constitutional, neuro, ENT, endocrine, pulmonary, cardiac, gastrointestinal, genitourinary, musculoskeletal, integument and psychiatric systems are negative, except as otherwise noted.      Objective    /70   Pulse 94   Temp 98  F (36.7  C) (Oral)   Resp 16   Ht 1.575 m (5' 2\")   Wt 68.8 kg (151 lb 11.2 oz)   SpO2 98%   BMI 27.75 kg/m    Body mass index is 27.75 kg/m .  Physical Exam   GENERAL: alert and no distress  EYES: Eyes grossly normal to inspection, PERRL and conjunctivae and sclerae normal  HENT: ear canals and TM's normal, nose and mouth without ulcers or lesions  NECK: mild, nontender symmetric cervical LAD, no asymmetry, masses, or scars  RESP: lungs clear to auscultation - no rales, rhonchi or wheezes  CV: regular rate and rhythm, normal S1 S2, no S3 or S4, no murmur, click or rub, no peripheral edema  ABDOMEN: soft, mild TTP in epigastric region, no hepatosplenomegaly, no masses and bowel sounds normal  MS: no gross musculoskeletal defects noted, no edema  SKIN: no suspicious lesions or rashes  NEURO: Normal strength and tone, mentation intact and speech normal  PSYCH: mentation appears normal, affect normal/bright    Admission on 02/16/2024, Discharged on 02/16/2024   Component Date Value Ref Range Status    Ventricular Rate 02/16/2024 70  BPM Final    Atrial Rate 02/16/2024 70  BPM Final    WV Interval 02/16/2024 170  ms Final    QRS Duration 02/16/2024 88  ms Final    QT 02/16/2024 372  ms Final    QTc 02/16/2024 401  ms Final    P Axis 02/16/2024 63  degrees Final    R AXIS 02/16/2024 -2  degrees Final    T [place] 02/16/2024 8  degrees Final    Interpretation ECG 02/16/2024    Final                    Value:Sinus rhythm  Normal ECG  No previous ECGs available  Confirmed by SEE ED PROVIDER NOTE FOR, ECG INTERPRETATION ([id number]),  ALYSHA, " KIRAN ([id number]) on 2/17/2024 12:23:52 AM      Sodium 02/16/2024 141  135 - 145 mmol/L Final    Reference intervals for this test were updated on 09/26/2023 to more accurately reflect our healthy population. There may be differences in the flagging of prior results with similar values performed with this method. Interpretation of those prior results can be made in the context of the updated reference intervals.     Potassium 02/16/2024 3.8  3.4 - 5.3 mmol/L Final    Carbon Dioxide (CO2) 02/16/2024 27  22 - 29 mmol/L Final    Anion Gap 02/16/2024 11  7 - 15 mmol/L Final    Urea Nitrogen 02/16/2024 15.4  6.0 - 20.0 mg/dL Final    Creatinine 02/16/2024 0.91  0.67 - 1.17 mg/dL Final    GFR Estimate 02/16/2024 >90  >60 mL/min/1.73m2 Final    Calcium 02/16/2024 9.0  8.6 - 10.0 mg/dL Final    Chloride 02/16/2024 103  98 - 107 mmol/L Final    Glucose 02/16/2024 87  70 - 99 mg/dL Final    Alkaline Phosphatase 02/16/2024 72  40 - 150 U/L Final    Reference intervals for this test were updated on 11/14/2023 to more accurately reflect our healthy population. There may be differences in the flagging of prior results with similar values performed with this method. Interpretation of those prior results can be made in the context of the updated reference intervals.    AST 02/16/2024 44  0 - 45 U/L Final    Reference intervals for this test were updated on 6/12/2023 to more accurately reflect our healthy population. There may be differences in the flagging of prior results with similar values performed with this method. Interpretation of those prior results can be made in the context of the updated reference intervals.    ALT 02/16/2024 103 (H)  0 - 70 U/L Final    Reference intervals for this test were updated on 6/12/2023 to more accurately reflect our healthy population. There may be differences in the flagging of prior results with similar values performed with this method. Interpretation of those prior results can be made in the  context of the updated reference intervals.      Protein Total 02/16/2024 7.8  6.4 - 8.3 g/dL Final    Albumin 02/16/2024 4.5  3.5 - 5.2 g/dL Final    Bilirubin Total 02/16/2024 0.3  <=1.2 mg/dL Final    Magnesium 02/16/2024 2.3  1.7 - 2.3 mg/dL Final    Lipase 02/16/2024 49  13 - 60 U/L Final    TSH 02/16/2024 1.72  0.30 - 4.20 uIU/mL Final    Troponin T, High Sensitivity 02/16/2024 <6  <=22 ng/L Final    Either a High Sensitivity Troponin T baseline (0 hours) value = 100 ng/L, or an increase in High Sensitivity Troponin T = 7 ng/L at 2 hours compared to 0 hours (2-0 hours), suggests myocardial injury, and urgent clinical attention is required.    If the 2-0 hours increase is <7 ng/L, a High Sensitivity Troponin T result above gender-specific reference ranges warrants further evaluation.   Recommendations for further evaluation include correlation with clinical decision-making tool (e.g., HEART), a 3rd High Sensitivity Troponin T test 2 hours after the 2nd (a 20% change from baseline would represent concern), admission for observation, close PCC/cardiology follow-up, or urgent outpatient provocative testing.    WBC Count 02/16/2024 9.7  4.0 - 11.0 10e3/uL Final    RBC Count 02/16/2024 5.52  4.40 - 5.90 10e6/uL Final    Hemoglobin 02/16/2024 15.7  13.3 - 17.7 g/dL Final    Hematocrit 02/16/2024 47.8  40.0 - 53.0 % Final    MCV 02/16/2024 87  78 - 100 fL Final    MCH 02/16/2024 28.4  26.5 - 33.0 pg Final    MCHC 02/16/2024 32.8  31.5 - 36.5 g/dL Final    RDW 02/16/2024 13.2  10.0 - 15.0 % Final    Platelet Count 02/16/2024 170  150 - 450 10e3/uL Final    % Neutrophils 02/16/2024 42  % Final    % Lymphocytes 02/16/2024 47  % Final    % Monocytes 02/16/2024 7  % Final    % Eosinophils 02/16/2024 2  % Final    % Basophils 02/16/2024 1  % Final    % Immature Granulocytes 02/16/2024 1  % Final    NRBCs per 100 WBC 02/16/2024 0  <1 /100 Final    Absolute Neutrophils 02/16/2024 4.1  1.6 - 8.3 10e3/uL Final    Absolute  Lymphocytes 02/16/2024 4.6  0.8 - 5.3 10e3/uL Final    Absolute Monocytes 02/16/2024 0.7  0.0 - 1.3 10e3/uL Final    Absolute Eosinophils 02/16/2024 0.2  0.0 - 0.7 10e3/uL Final    Absolute Basophils 02/16/2024 0.1  0.0 - 0.2 10e3/uL Final    Absolute Immature Granulocytes 02/16/2024 0.1  <=0.4 10e3/uL Final    Absolute NRBCs 02/16/2024 0.0  10e3/uL Final    Hold Specimen 02/16/2024 JIC   Final    Hold Specimen 02/16/2024 JIC   Final    Influenza A PCR 02/16/2024 Negative  Negative Final    Influenza B PCR 02/16/2024 Negative  Negative Final    RSV PCR 02/16/2024 Negative  Negative Final    SARS CoV2 PCR 02/16/2024 Negative  Negative Final    NEGATIVE: SARS-CoV-2 (COVID-19) RNA not detected, presumed negative.    Group A strep by PCR 02/16/2024 Not Detected  Not Detected Final           Signed Electronically by: Carri iLvingston MD

## 2024-03-01 NOTE — ASSESSMENT & PLAN NOTE
Wife also tells me that they had biometric screening recently that did show high cholesterol.  She will bring these results to next visit.

## 2024-03-04 DIAGNOSIS — R10.13 EPIGASTRIC PAIN: ICD-10-CM

## 2024-06-01 ENCOUNTER — E-VISIT (OUTPATIENT)
Dept: INTERNAL MEDICINE | Facility: CLINIC | Age: 39
End: 2024-06-01
Payer: COMMERCIAL

## 2024-06-01 DIAGNOSIS — R51.9 NONINTRACTABLE HEADACHE, UNSPECIFIED CHRONICITY PATTERN, UNSPECIFIED HEADACHE TYPE: Primary | ICD-10-CM

## 2024-06-01 PROCEDURE — 99207 PR NON-BILLABLE SERV PER CHARTING: CPT | Performed by: INTERNAL MEDICINE

## 2024-06-03 NOTE — PATIENT INSTRUCTIONS
Thank you for choosing us for your care. I think an in-clinic or virtual visit would be the best next step based on your symptoms. Please schedule a clinic appointment; you won t be charged for this eVisit.      You can schedule an appointment by clicking here in [employer], or call [phone].

## 2024-06-08 ENCOUNTER — HOSPITAL ENCOUNTER (EMERGENCY)
Facility: HOSPITAL | Age: 39
Discharge: HOME | End: 2024-06-09
Attending: EMERGENCY MEDICINE | Admitting: EMERGENCY MEDICINE
Payer: COMMERCIAL

## 2024-06-08 VITALS
WEIGHT: 147 LBS | RESPIRATION RATE: 20 BRPM | TEMPERATURE: 98.3 F | DIASTOLIC BLOOD PRESSURE: 82 MMHG | SYSTOLIC BLOOD PRESSURE: 122 MMHG | BODY MASS INDEX: 27.05 KG/M2 | HEART RATE: 85 BPM | OXYGEN SATURATION: 98 % | HEIGHT: 62 IN

## 2024-06-08 DIAGNOSIS — R42 DIZZINESS: ICD-10-CM

## 2024-06-08 DIAGNOSIS — G43.809 OTHER MIGRAINE WITHOUT STATUS MIGRAINOSUS, NOT INTRACTABLE: ICD-10-CM

## 2024-06-08 LAB
HOLD SPECIMEN: NORMAL

## 2024-06-08 PROCEDURE — 96375 TX/PRO/DX INJ NEW DRUG ADDON: CPT

## 2024-06-08 PROCEDURE — 96374 THER/PROPH/DIAG INJ IV PUSH: CPT

## 2024-06-08 PROCEDURE — 96361 HYDRATE IV INFUSION ADD-ON: CPT

## 2024-06-08 PROCEDURE — 99284 EMERGENCY DEPT VISIT MOD MDM: CPT | Mod: 25

## 2024-06-08 PROCEDURE — 258N000003 HC RX IP 258 OP 636: Mod: JZ | Performed by: EMERGENCY MEDICINE

## 2024-06-08 PROCEDURE — 250N000011 HC RX IP 250 OP 636: Mod: JZ | Performed by: EMERGENCY MEDICINE

## 2024-06-08 RX ORDER — DEXAMETHASONE SODIUM PHOSPHATE 4 MG/ML
10 INJECTION, SOLUTION INTRA-ARTICULAR; INTRALESIONAL; INTRAMUSCULAR; INTRAVENOUS; SOFT TISSUE ONCE
Status: COMPLETED | OUTPATIENT
Start: 2024-06-08 | End: 2024-06-08

## 2024-06-08 RX ORDER — KETOROLAC TROMETHAMINE 15 MG/ML
15 INJECTION, SOLUTION INTRAMUSCULAR; INTRAVENOUS ONCE
Status: COMPLETED | OUTPATIENT
Start: 2024-06-08 | End: 2024-06-08

## 2024-06-08 RX ORDER — ONDANSETRON 4 MG/1
4 TABLET, ORALLY DISINTEGRATING ORAL EVERY 6 HOURS PRN
Qty: 20 TABLET | Refills: 0 | Status: SHIPPED | OUTPATIENT
Start: 2024-06-08 | End: 2024-06-15

## 2024-06-08 RX ORDER — MECLIZINE HYDROCHLORIDE 25 MG/1
25 TABLET ORAL 3 TIMES DAILY PRN
Qty: 30 TABLET | Refills: 0 | Status: SHIPPED | OUTPATIENT
Start: 2024-06-08

## 2024-06-08 RX ORDER — MECLIZINE HCL 12.5 MG 12.5 MG/1
25 TABLET ORAL ONCE
Status: COMPLETED | OUTPATIENT
Start: 2024-06-08 | End: 2024-06-08

## 2024-06-08 RX ADMIN — SODIUM CHLORIDE 1000 ML: 9 INJECTION, SOLUTION INTRAVENOUS at 22:24

## 2024-06-08 RX ADMIN — KETOROLAC TROMETHAMINE 15 MG: 15 INJECTION, SOLUTION INTRAMUSCULAR; INTRAVENOUS at 22:24

## 2024-06-08 RX ADMIN — DEXAMETHASONE SODIUM PHOSPHATE 10 MG: 4 INJECTION, SOLUTION INTRA-ARTICULAR; INTRALESIONAL; INTRAMUSCULAR; INTRAVENOUS; SOFT TISSUE at 22:26

## 2024-06-08 RX ADMIN — PROCHLORPERAZINE EDISYLATE 10 MG: 5 INJECTION INTRAMUSCULAR; INTRAVENOUS at 22:29

## 2024-06-08 ASSESSMENT — COLUMBIA-SUICIDE SEVERITY RATING SCALE - C-SSRS
6. HAVE YOU EVER DONE ANYTHING, STARTED TO DO ANYTHING, OR PREPARED TO DO ANYTHING TO END YOUR LIFE?: NO
1. IN THE PAST MONTH, HAVE YOU WISHED YOU WERE DEAD OR WISHED YOU COULD GO TO SLEEP AND NOT WAKE UP?: NO
2. HAVE YOU ACTUALLY HAD ANY THOUGHTS OF KILLING YOURSELF IN THE PAST MONTH?: NO

## 2024-06-08 ASSESSMENT — ACTIVITIES OF DAILY LIVING (ADL)
ADLS_ACUITY_SCORE: 35
ADLS_ACUITY_SCORE: 33

## 2024-06-09 NOTE — DISCHARGE INSTRUCTIONS
You were seen in the Emergency Department today for a headache and dizziness.      You are being sent with a prescription for zofran to use as needed for nausea and meclizine if you have more dizziness.     For pain  - You can take 600mg of Ibuprofen (Motrin, Advil) by mouth with food every 6-8 hours (no more than 3200mg in 24hrs).    - You may also take 650-1000mg of Acetaminophen (Tylenol) along with the Ibuprofen.  Please do not use more than 3000 mg in a 24 hour period. Tylenol is an effective drug when taken at the prescribed dosages but can cause bodily injury including liver damage if taken too often or at too high of dose.  - You can take one or the other every 3 hours while awake (such that each is taken every 6 hours). For example, if you take Tylenol when you get home then you would take ibuprofen 3 hours later followed by another dose of Tylenol 3 hours after that. Write down the times you are taking both medications to ensure appropriate time in between doses.       Please return to the ER if you experience inability to keep fluids down, weakness in one part of your body, falls, uncontrolled pain, and/or for any other new or concerning symptoms, otherwise please follow up with your primary doctor in 5-7 days for recheck.     Below is some information you might find useful.     Thank you for choosing [facility]. It was a pleasure taking care of you today!  - Dr. Lay Suarez

## 2024-06-09 NOTE — ED PROVIDER NOTES
EMERGENCY DEPARTMENT ENCOUNTER      NAME: Mick Mansfield  YOB: 1985  MRN: [id number]    FINAL IMPRESSION  1. Other migraine without status migrainosus, not intractable    2. Dizziness        MEDICAL DECISION MAKING   Pertinent Labs & Imaging studies reviewed. (See chart for details)    Mick Mansfield is a 38 year old male who presents for evaluation of a headache and nausea.  Patient reports having more frequent migraine headaches over the last 2 weeks.  The one he presents with today began this morning and has been constant since onset.  He was at a birthday party at his brother's this evening and had been drinking alcohol so family did not recommend that he take any medication for his symptoms.  He has been dealing with some ongoing and intermittent dizziness for several months and has had some of this today but is more concerned about the headache than anything.  He reports associated nausea and photophobia but denies fever, fall/trauma, focal neurodeficit, or any other new complaints.    Records reviewed.  Patient had an ED visit on 6/1/2024 for evaluation of of a headache.  I reviewed that note.  He was also seen here in the ED on 2/16/2024 for evaluation of lightheadedness and followed up in clinic on 3/1/2024.  Reviewed that note as well.    I considered a broad differential diagnosis including, but not limited to migraine, tension headache, cluster headache, sinusitis, temporal arteritis, BPPV. No falls to suggest traumatic epidural/subdural hematoma. Patient has a non-focal neuro exam so have low suspicion for intracranial process such as CVA, SAH, SDH. There are no fever or infections symptoms to suggest meningitis or encephalitis. The patient also denies vision change or ocular pain and has no exam findings to suggest acute angle-closure glaucoma. There is no temporal tenderness, vision change to suggest temporal arteritis and no concerning findings on history or exam to suggest tumor/mass.  Given history, reassuring exam, and in the absence of s/s suggestive of concerning intracranial pathology, I do feel that etiology is most likely primary/benign headache.  Discussed options for workup and management with patient and his significant other.  We have agreed on plan to start by focusing on management of symptoms with IV fluids, Toradol, Reglan, and Decadron.  Will also try meclizine for intermittent dizziness.  If he has improvement in symptoms, will hold on imaging/labs.    Patient had resolution of symptoms after initial interventions and was eager to go home.  He has been able to tolerate p.o. and ambulate with a steady gait.  The dizziness that he has been experiencing intermittently for several months has also resolved.  We have agreed on plan for discharge with a prescription for Zofran and meclizine to use as needed and follow-up with primary care provider.    Strict return precautions and follow up recommendations were discussed and all questions were answered. Patient  endorsed understanding and was in agreement with plan.    Medical Decision Making  Obtained supplemental history: Wife  Reviewed external records: External records reviewed?: No  Care impacted by chronic illness:N/A  Care significantly affected by social determinants of health:Access to Medical Care  Did you consider but not order tests?: Work up considered but not performed and documented in chart, if applicable  Did you interpret images independently?: Independent interpretation of ECG and images noted in documentation, when applicable.  Consultation discussion with other provider:Did you involve another provider (consultant, , pharmacy, etc.)?: No  Discharge. I prescribed additional prescription strength medication(s) as charted. I considered admission, but discharged patient after significant clinical improvement.      ED COURSE  10:43 PM I met with the patient, obtained history, performed an initial exam, and discussed  options and plan for diagnostics and treatment here in the ED.   10:58 PM Patient able to ambulate to bathroom.   11:15 PM I rechecked the patient.         MEDICATIONS GIVEN IN THE ED  Medications   ketorolac (TORADOL) injection 15 mg (15 mg Intravenous $Given 6/8/24 2224)   prochlorperazine (COMPAZINE) injection 10 mg (10 mg Intravenous $Given 6/8/24 2229)   dexAMETHasone (DECADRON) injection 10 mg (10 mg Intravenous $Given 6/8/24 2226)   sodium chloride 0.9% BOLUS 1,000 mL (0 mLs Intravenous Stopped 6/8/24 2323)   meclizine (ANTIVERT) tablet 25 mg (25 mg Oral Not Given 6/8/24 2323)       NEW PRESCRIPTIONS STARTED AT TODAY'S VISIT  Discharge Medication List as of 6/8/2024 11:23 PM        START taking these medications    Details   meclizine (ANTIVERT) 25 MG tablet Take 1 tablet (25 mg) by mouth 3 times daily as needed, Disp-30 tablet, R-0, Local Print      ondansetron (ZOFRAN ODT) 4 MG ODT tab Take 1 tablet (4 mg) by mouth every 6 hours as needed, Disp-20 tablet, R-0, Local Print                =================================================================    Chief Complaint   Patient presents with    Headache         HPI:    Patient information was obtained from: The patient    Use of : N/A     Mick Mansfield is a 38 year old male who presents with headache.    The patient is complaining of a migraine at the right occipital scalp since this morning. His dizziness worsened tonight when he was at his brother's place. He was drinking alcohol tonight. He endorses slight room-spinning dizziness and nausea. He has been dealing with dizziness during the year.     Otherwise, the patient denied vomiting, weakness, blurred vision, and any other medical complaints at this time.        RELEVANT HISTORY, MEDICATIONS, & ALLERGIES   Past medical history, surgical history, family history, medications, and allergies reviewed and pertinent noted in HPI.    REVIEW OF SYSTEMS:  A complete review of systems was performed  "with pertinent positives and negatives noted in the HPI. All other systems negative.     PHYSICAL EXAM:    Vitals: /82   Pulse 85   Temp 98.3  F (36.8  C) (Oral)   Resp 20   Ht 1.575 m (5' 2\")   Wt 66.7 kg (147 lb)   SpO2 98%   BMI 26.89 kg/m     General: Alert and interactive, comfortable appearing.  HENT: Atraumatic. Full AROM of neck. Conjunctiva clear. PERRL, EOM intact, no nystagmus, mild tenderness over right occipital and parietal scalp, no nuchal rigidity.  Cardiovascular: Regular rate and rhythm.   Chest/Pulmonary: Normal work of breathing. Speaking in complete sentences. Lungs CTAB. No chest wall tenderness or deformities.  Abdomen: Soft, nondistended. Nontender without guarding or rebound.  Extremities: Normal AROM of all major joints.  Skin: Warm and dry. Normal skin color.   Neuro: Speech clear. CNs grossly intact. Moves all extremities spontaneously. Ambulatory. No ataxia.  Psych: Normal affect/mood, cooperative, memory appropriate.      I, Carson Ramírez, am serving as a scribe to document services personally performed by Dr. Lay Suarez based on my observation and the provider's statements to me. ILay MD attest that Carson Ramírez is acting in a scribe capacity, has observed my performance of the services and has documented them in accordance with my direction.    Lay Suarez M.D.  Emergency Medicine  [facility] EMERGENCY DEPARTMENT  [facility] [facility] [id number] [phone]  Dept: [phone]     Lay Suarez MD  06/09/24 0615    "

## 2024-06-09 NOTE — ED TRIAGE NOTES
Pt reports headache all day that has progressively gotten worse. HA is located to the back of the head. No meds PTA. No hx of migraines.      Triage Assessment (Adult)       Row Name 06/08/24 6248          Triage Assessment    Airway WDL WDL        Respiratory WDL    Respiratory WDL WDL        Cardiac WDL    Cardiac WDL WDL

## 2024-07-01 ENCOUNTER — OFFICE VISIT (OUTPATIENT)
Dept: FAMILY MEDICINE | Facility: CLINIC | Age: 39
End: 2024-07-01
Payer: COMMERCIAL

## 2024-07-01 VITALS
BODY MASS INDEX: 27.9 KG/M2 | SYSTOLIC BLOOD PRESSURE: 98 MMHG | OXYGEN SATURATION: 97 % | HEIGHT: 62 IN | WEIGHT: 151.6 LBS | HEART RATE: 82 BPM | RESPIRATION RATE: 16 BRPM | TEMPERATURE: 98.6 F | DIASTOLIC BLOOD PRESSURE: 68 MMHG

## 2024-07-01 DIAGNOSIS — G43.111 INTRACTABLE MIGRAINE WITH AURA WITH STATUS MIGRAINOSUS: Primary | ICD-10-CM

## 2024-07-01 PROCEDURE — 99213 OFFICE O/P EST LOW 20 MIN: CPT | Mod: 25 | Performed by: STUDENT IN AN ORGANIZED HEALTH CARE EDUCATION/TRAINING PROGRAM

## 2024-07-01 PROCEDURE — 96372 THER/PROPH/DIAG INJ SC/IM: CPT | Performed by: STUDENT IN AN ORGANIZED HEALTH CARE EDUCATION/TRAINING PROGRAM

## 2024-07-01 RX ORDER — KETOROLAC TROMETHAMINE 30 MG/ML
30 INJECTION, SOLUTION INTRAMUSCULAR; INTRAVENOUS ONCE
Status: COMPLETED | OUTPATIENT
Start: 2024-07-01 | End: 2024-07-01

## 2024-07-01 RX ORDER — PROPRANOLOL HCL 60 MG
60 CAPSULE, EXTENDED RELEASE 24HR ORAL DAILY
Qty: 90 CAPSULE | Refills: 0 | Status: SHIPPED | OUTPATIENT
Start: 2024-07-01 | End: 2024-07-23

## 2024-07-01 RX ORDER — SUMATRIPTAN 25 MG/1
25 TABLET, FILM COATED ORAL
Qty: 15 TABLET | Refills: 0 | Status: SHIPPED | OUTPATIENT
Start: 2024-07-01 | End: 2024-07-23

## 2024-07-01 RX ADMIN — KETOROLAC TROMETHAMINE 30 MG: 30 INJECTION, SOLUTION INTRAMUSCULAR; INTRAVENOUS at 16:00

## 2024-07-01 ASSESSMENT — PAIN SCALES - GENERAL: PAINLEVEL: NO PAIN (0)

## 2024-07-23 ENCOUNTER — OFFICE VISIT (OUTPATIENT)
Dept: FAMILY MEDICINE | Facility: CLINIC | Age: 39
End: 2024-07-23
Payer: COMMERCIAL

## 2024-07-23 VITALS
OXYGEN SATURATION: 97 % | BODY MASS INDEX: 28.4 KG/M2 | DIASTOLIC BLOOD PRESSURE: 74 MMHG | WEIGHT: 152.8 LBS | SYSTOLIC BLOOD PRESSURE: 110 MMHG | RESPIRATION RATE: 20 BRPM | TEMPERATURE: 98.4 F | HEART RATE: 76 BPM

## 2024-07-23 DIAGNOSIS — F07.81 POST CONCUSSION SYNDROME: ICD-10-CM

## 2024-07-23 DIAGNOSIS — Z00.00 ROUTINE GENERAL MEDICAL EXAMINATION AT A HEALTH CARE FACILITY: Primary | ICD-10-CM

## 2024-07-23 DIAGNOSIS — E78.5 HYPERLIPIDEMIA WITH TARGET LDL LESS THAN 130: ICD-10-CM

## 2024-07-23 DIAGNOSIS — R42 DIZZINESS: ICD-10-CM

## 2024-07-23 DIAGNOSIS — H53.8 BLURRED VISION: ICD-10-CM

## 2024-07-23 DIAGNOSIS — R53.83 OTHER FATIGUE: ICD-10-CM

## 2024-07-23 DIAGNOSIS — G43.111 INTRACTABLE MIGRAINE WITH AURA WITH STATUS MIGRAINOSUS: ICD-10-CM

## 2024-07-23 DIAGNOSIS — G44.209 TENSION HEADACHE: ICD-10-CM

## 2024-07-23 LAB
ALBUMIN SERPL BCG-MCNC: 4.5 G/DL (ref 3.5–5.2)
ALP SERPL-CCNC: 67 U/L (ref 40–150)
ALT SERPL W P-5'-P-CCNC: 58 U/L (ref 0–70)
ANION GAP SERPL CALCULATED.3IONS-SCNC: 10 MMOL/L (ref 7–15)
AST SERPL W P-5'-P-CCNC: 38 U/L (ref 0–45)
BILIRUB SERPL-MCNC: 0.2 MG/DL
BUN SERPL-MCNC: 12.8 MG/DL (ref 6–20)
CALCIUM SERPL-MCNC: 9.4 MG/DL (ref 8.8–10.4)
CHLORIDE SERPL-SCNC: 103 MMOL/L (ref 98–107)
CHOLEST SERPL-MCNC: 251 MG/DL
CREAT SERPL-MCNC: 0.97 MG/DL (ref 0.67–1.17)
EGFRCR SERPLBLD CKD-EPI 2021: >90 ML/MIN/1.73M2
ERYTHROCYTE [DISTWIDTH] IN BLOOD BY AUTOMATED COUNT: 12.8 % (ref 10–15)
FASTING STATUS PATIENT QL REPORTED: NO
FASTING STATUS PATIENT QL REPORTED: NO
GLUCOSE SERPL-MCNC: 94 MG/DL (ref 70–99)
HBA1C MFR BLD: 5.7 % (ref 0–5.6)
HCO3 SERPL-SCNC: 27 MMOL/L (ref 22–29)
HCT VFR BLD AUTO: 45.9 % (ref 40–53)
HDLC SERPL-MCNC: 35 MG/DL
HGB BLD-MCNC: 15.1 G/DL (ref 13.3–17.7)
LDLC SERPL CALC-MCNC: ABNORMAL MG/DL
LDLC SERPL DIRECT ASSAY-MCNC: 162 MG/DL
MAGNESIUM SERPL-MCNC: 2.4 MG/DL (ref 1.7–2.3)
MCH RBC QN AUTO: 28 PG (ref 26.5–33)
MCHC RBC AUTO-ENTMCNC: 32.9 G/DL (ref 31.5–36.5)
MCV RBC AUTO: 85 FL (ref 78–100)
NONHDLC SERPL-MCNC: 216 MG/DL
PLATELET # BLD AUTO: 148 10E3/UL (ref 150–450)
POTASSIUM SERPL-SCNC: 3.9 MMOL/L (ref 3.4–5.3)
PROT SERPL-MCNC: 7.5 G/DL (ref 6.4–8.3)
RBC # BLD AUTO: 5.4 10E6/UL (ref 4.4–5.9)
SODIUM SERPL-SCNC: 140 MMOL/L (ref 135–145)
TRIGL SERPL-MCNC: 470 MG/DL
TSH SERPL DL<=0.005 MIU/L-ACNC: 1.37 UIU/ML (ref 0.3–4.2)
VIT B12 SERPL-MCNC: 853 PG/ML (ref 232–1245)
VIT D+METAB SERPL-MCNC: 25 NG/ML (ref 20–50)
WBC # BLD AUTO: 7.9 10E3/UL (ref 4–11)

## 2024-07-23 PROCEDURE — 84443 ASSAY THYROID STIM HORMONE: CPT | Performed by: STUDENT IN AN ORGANIZED HEALTH CARE EDUCATION/TRAINING PROGRAM

## 2024-07-23 PROCEDURE — 82306 VITAMIN D 25 HYDROXY: CPT | Performed by: STUDENT IN AN ORGANIZED HEALTH CARE EDUCATION/TRAINING PROGRAM

## 2024-07-23 PROCEDURE — 99395 PREV VISIT EST AGE 18-39: CPT | Performed by: STUDENT IN AN ORGANIZED HEALTH CARE EDUCATION/TRAINING PROGRAM

## 2024-07-23 PROCEDURE — 83721 ASSAY OF BLOOD LIPOPROTEIN: CPT | Mod: 59 | Performed by: STUDENT IN AN ORGANIZED HEALTH CARE EDUCATION/TRAINING PROGRAM

## 2024-07-23 PROCEDURE — 36415 COLL VENOUS BLD VENIPUNCTURE: CPT | Performed by: STUDENT IN AN ORGANIZED HEALTH CARE EDUCATION/TRAINING PROGRAM

## 2024-07-23 PROCEDURE — 80053 COMPREHEN METABOLIC PANEL: CPT | Performed by: STUDENT IN AN ORGANIZED HEALTH CARE EDUCATION/TRAINING PROGRAM

## 2024-07-23 PROCEDURE — 83735 ASSAY OF MAGNESIUM: CPT | Performed by: STUDENT IN AN ORGANIZED HEALTH CARE EDUCATION/TRAINING PROGRAM

## 2024-07-23 PROCEDURE — 83036 HEMOGLOBIN GLYCOSYLATED A1C: CPT | Performed by: STUDENT IN AN ORGANIZED HEALTH CARE EDUCATION/TRAINING PROGRAM

## 2024-07-23 PROCEDURE — 80061 LIPID PANEL: CPT | Performed by: STUDENT IN AN ORGANIZED HEALTH CARE EDUCATION/TRAINING PROGRAM

## 2024-07-23 PROCEDURE — 85027 COMPLETE CBC AUTOMATED: CPT | Performed by: STUDENT IN AN ORGANIZED HEALTH CARE EDUCATION/TRAINING PROGRAM

## 2024-07-23 PROCEDURE — 99213 OFFICE O/P EST LOW 20 MIN: CPT | Mod: 25 | Performed by: STUDENT IN AN ORGANIZED HEALTH CARE EDUCATION/TRAINING PROGRAM

## 2024-07-23 PROCEDURE — 82607 VITAMIN B-12: CPT | Performed by: STUDENT IN AN ORGANIZED HEALTH CARE EDUCATION/TRAINING PROGRAM

## 2024-07-23 RX ORDER — SUMATRIPTAN 50 MG/1
50 TABLET, FILM COATED ORAL
Qty: 15 TABLET | Refills: 0 | Status: SHIPPED | OUTPATIENT
Start: 2024-07-23

## 2024-07-23 RX ORDER — PROPRANOLOL HYDROCHLORIDE 80 MG/1
80 CAPSULE, EXTENDED RELEASE ORAL DAILY
Qty: 90 CAPSULE | Refills: 0 | Status: SHIPPED | OUTPATIENT
Start: 2024-07-23

## 2024-07-23 SDOH — HEALTH STABILITY: PHYSICAL HEALTH: ON AVERAGE, HOW MANY DAYS PER WEEK DO YOU ENGAGE IN MODERATE TO STRENUOUS EXERCISE (LIKE A BRISK WALK)?: 2 DAYS

## 2024-07-23 SDOH — HEALTH STABILITY: PHYSICAL HEALTH: ON AVERAGE, HOW MANY MINUTES DO YOU ENGAGE IN EXERCISE AT THIS LEVEL?: 60 MIN

## 2024-07-23 ASSESSMENT — SOCIAL DETERMINANTS OF HEALTH (SDOH): HOW OFTEN DO YOU GET TOGETHER WITH FRIENDS OR RELATIVES?: ONCE A WEEK

## 2024-07-23 NOTE — PATIENT INSTRUCTIONS
Patient Education   Preventive Care Advice   This is general advice given by our system to help you stay healthy. However, your care team may have specific advice just for you. Please talk to your care team about your preventive care needs.  Nutrition  Eat 5 or more servings of fruits and vegetables each day.  Try wheat bread, brown rice and whole grain pasta (instead of white bread, rice, and pasta).  Get enough calcium and vitamin D. Check the label on foods and aim for 100% of the RDA (recommended daily allowance).  Lifestyle  Exercise at least 150 minutes each week  (30 minutes a day, 5 days a week).  Do muscle strengthening activities 2 days a week. These help control your weight and prevent disease.  No smoking.  Wear sunscreen to prevent skin cancer.  Have a dental exam and cleaning every 6 months.  Yearly exams  See your health care team every year to talk about:  Any changes in your health.  Any medicines your care team has prescribed.  Preventive care, family planning, and ways to prevent chronic diseases.  Shots (vaccines)   HPV shots (up to age 26), if you've never had them before.  Hepatitis B shots (up to age 59), if you've never had them before.  COVID-19 shot: Get this shot when it's due.  Flu shot: Get a flu shot every year.  Tetanus shot: Get a tetanus shot every 10 years.  Pneumococcal, hepatitis A, and RSV shots: Ask your care team if you need these based on your risk.  Shingles shot (for age 50 and up)  General health tests  Diabetes screening:  Starting at age 35, Get screened for diabetes at least every 3 years.  If you are younger than age 35, ask your care team if you should be screened for diabetes.  Cholesterol test: At age 39, start having a cholesterol test every 5 years, or more often if advised.  Bone density scan (DEXA): At age 50, ask your care team if you should have this scan for osteoporosis (brittle bones).  Hepatitis C: Get tested at least once in your life.  STIs (sexually  transmitted infections)  Before age 24: Ask your care team if you should be screened for STIs.  After age 24: Get screened for STIs if you're at risk. You are at risk for STIs (including HIV) if:  You are sexually active with more than one person.  You don't use condoms every time.  You or a partner was diagnosed with a sexually transmitted infection.  If you are at risk for HIV, ask about PrEP medicine to prevent HIV.  Get tested for HIV at least once in your life, whether you are at risk for HIV or not.  Cancer screening tests  Cervical cancer screening: If you have a cervix, begin getting regular cervical cancer screening tests starting at age 21.  Breast cancer scan (mammogram): If you've ever had breasts, begin having regular mammograms starting at age 40. This is a scan to check for breast cancer.  Colon cancer screening: It is important to start screening for colon cancer at age 45.  Have a colonoscopy test every 10 years (or more often if you're at risk) Or, ask your provider about stool tests like a FIT test every year or Cologuard test every 3 years.  To learn more about your testing options, visit:   .  For help making a decision, visit:   https://bit.ly/cw78864.  Prostate cancer screening test: If you have a prostate, ask your care team if a prostate cancer screening test (PSA) at age 55 is right for you.  Lung cancer screening: If you are a current or former smoker ages 50 to 80, ask your care team if ongoing lung cancer screenings are right for you.  For informational purposes only. Not to replace the advice of your health care provider. Copyright   2023 [employer]. All rights reserved. Clinically reviewed by the [facility] Program. [employer] [id number] - REV 01/24.

## 2024-07-23 NOTE — PROGRESS NOTES
"Preventive Care Visit  [facility]  Jul 23, 2024      Assessment & Plan        Diagnosis Comments   1. Routine general medical examination at a health care facility  CBC with platelets, Lipid Profile (Chol, Trig, HDL, LDL calc), Comprehensive metabolic panel (BMP + Alb, Alk Phos, ALT, AST, Total. Bili, TP), TSH with free T4 reflex, Hemoglobin A1c       2. Hyperlipidemia with target LDL less than 130  Lipid panel reflex to direct LDL Non-fasting, LDL cholesterol direct       3. Intractable migraine with aura with status migrainosus  propranolol ER (INDERAL LA) 80 MG 24 hr capsule, SUMAtriptan (IMITREX) 50 MG tablet, Magnesium, MR Brain w/o Contrast       4. Tension headache        5. Other fatigue  Vitamin D Deficiency, Vitamin B12       6. Blurred vision  MR Brain w/o Contrast       7. Dizziness  MR Brain w/o Contrast       8. Post concussion syndrome              Home life:lives with wife   Occupation:stable   Safety concerns:none   Diet/exercise: sedentary and diet indiscretionary. Counseling provided   Family history of cancers:none he is aware of   Eye exam/dental exam:needs to set up appt   Alcohol use: denies heavy use   Tobacco use/marijuana use/drug use:none   Mental health:stable   Vaccines: would like to wait on them   Blood work:ordered     Too young for any cancer screenings     Migraine   Blurry vision   Dizziness   Post concussion syndrome  Headaches improved but is  reporting dizziness, \"heavy head\" and difficulty focusing on screens.  I am wondering if some of his symptoms are more in line with postconcussion given the head trauma that he had 2 years ago and never got addressed.  He also reports some symptoms of tension headache.  Today, I am concerned given ongoing persistent headaches, vision changes and the dizziness.  Will proceed with MRI of the brain to rule out secondary etiology.  Will also start her on propranolol for migraine " prophylaxis.  If brain MRI is negative, can do PT/OT for post concussive syndrome.May verbalized understanding   Advised him to set up an eye exam     Patient has been advised of split billing requirements and indicates understanding: Yes        Counseling  Appropriate preventive services were addressed with this patient via screening, questionnaire, or discussion as appropriate for fall prevention, nutrition, physical activity, Tobacco-use cessation, weight loss and cognition.  Checklist reviewing preventive services available has been given to the patient.  Reviewed patient's diet, addressing concerns and/or questions.   He is at risk for lack of exercise and has been provided with information to increase physical activity for the benefit of his well-being.   He is at risk for psychosocial distress and has been provided with information to reduce risk.       Mary Barton is a 38 year old, presenting for the following:  Follow Up (Headaches no change in this they are still the same )        7/23/2024     3:16 PM   Additional Questions   Roomed by Valarie KEBEDE        Health Care Directive  Patient does not have a Health Care Directive or Living Will: Discussed advance care planning with patient; information given to patient to review.    HPI    On 7/1/2024 for intractable migraine.  At that time, patient was started on Imitrex, given Toradol injection and advised to work on, hydration and come back to see me in a few weeks.    Today, patient reports that overall migraine intensity has decreased but he still having symptoms.  When he takes the Imitrex with combination of NSAID, the headache intensity subsides but he still feels very dizzy.  He is having headaches frequently.  However, more so than the headaches, he is worried about dizziness that has been prominent.  The dizziness has been there for a long time but has not subsided.  He feels like his head is still very heavy.  Does not have any sharp shooting  "pains.  At times, he does feel like his head is being squeezed from both sides.  When the migraines come, they usually of the left side.  He is also reporting intermittent blurry vision when he is trying to look at screens for long periods of time, do eating.    Patient reports that his symptoms really started and became worse after he hit his head \"really hard\" when he slipped on ice while ice fishing about 2 years ago.  He had fallen backwards and then \"blacked out for 15 minutes\".  He does not recall any vomiting post impact.  He was unable to get up right away and his brother had to pick him up and take him home.  He did not go to the emergency room for this and has not had any workup done..    Denies any falls or focal neurologic deficits.  No significant weakness.    He has not gone to see his eye doctor last time    Patient also does not follow longitudinally with a provider and would like to get his preventative visit done today        7/23/2024   General Health   How would you rate your overall physical health? Good   Feel stress (tense, anxious, or unable to sleep) Only a little      (!) STRESS CONCERN      7/23/2024   Nutrition   Three or more servings of calcium each day? Yes   Diet: Regular (no restrictions)   How many servings of fruit and vegetables per day? (!) 2-3   How many sweetened beverages each day? 0-1            7/23/2024   Exercise   Days per week of moderate/strenous exercise 2 days   Average minutes spent exercising at this level 60 min      (!) EXERCISE CONCERN      7/23/2024   Social Factors   Frequency of gathering with friends or relatives Once a week   Worry food won't last until get money to buy more No   Food not last or not have enough money for food? No   Do you have housing? (Housing is defined as stable permanent housing and does not include staying ouside in a car, in a tent, in an abandoned building, in an overnight shelter, or couch-surfing.) Yes   Are you worried about " losing your housing? No   Lack of transportation? No   Unable to get utilities (heat,electricity)? No            7/23/2024   Dental   Dentist two times every year? Yes            7/23/2024   TB Screening   Were you born outside of the [place]? Yes        Today's PHQ-2 Score:       3/1/2024     2:33 PM   PHQ-2 ( 1999 [employer])   Q1: Little interest or pleasure in doing things 1   Q2: Feeling down, depressed or hopeless 1   PHQ-2 Score 2   Q1: Little interest or pleasure in doing things Several days   Q2: Feeling down, depressed or hopeless Several days   PHQ-2 Score 2         7/23/2024   Substance Use   If I could quit smoking, I would Completely agree   I want to quit somking, worry about health affects Somewhat agree   Willing to make a plan to quit smoking Completely agree   Willing to cut down before quitting Completely agree   Alcohol more than 3/day or more than 7/wk No   Do you use any other substances recreationally? No        Social History     Tobacco Use    Smoking status: Every Day     Types: Cigarettes    Smokeless tobacco: Never   Vaping Use    Vaping status: Never Used   Substance Use Topics    Alcohol use: Yes     Comment: Occasionally    Drug use: Never             7/23/2024   One time HIV Screening   Previous HIV test? No          7/23/2024   STI Screening   New sexual partner(s) since last STI/HIV test? No            7/23/2024   Contraception/Family Planning   Questions about contraception or family planning No           Reviewed and updated as needed this visit by Provider   Tobacco  Allergies  Meds  Problems  Med Hx  Surg Hx  Fam Hx            History reviewed. No pertinent past medical history.  History reviewed. No pertinent surgical history.  OB History   No obstetric history on file.     Lab work is in process  Labs reviewed in EPIC  BP Readings from Last 3 Encounters:   07/23/24 110/74   07/01/24 98/68   06/08/24 122/82    Wt Readings from Last 3 Encounters:   07/23/24 69.3 kg (152 lb 12.8  "oz)   07/01/24 68.8 kg (151 lb 9.6 oz)   06/08/24 66.7 kg (147 lb)                  Patient Active Problem List   Diagnosis    Epigastric pain    Hyperlipidemia with target LDL less than 130    Dizziness     History reviewed. No pertinent surgical history.    Social History     Tobacco Use    Smoking status: Every Day     Types: Cigarettes    Smokeless tobacco: Never   Substance Use Topics    Alcohol use: Yes     Comment: Occasionally     History reviewed. No pertinent family history.      Current Outpatient Medications   Medication Sig Dispense Refill    Acetaminophen (TYLENOL PO) Take 500 mg by mouth every 4 hours as needed for mild pain or fever 1-2 tablets PRN      Aspirin-Acetaminophen-Caffeine (EXCEDRIN PO) PRN - is unsure how many mg he is taking      meclizine (ANTIVERT) 25 MG tablet Take 1 tablet (25 mg) by mouth 3 times daily as needed 30 tablet 0    propranolol ER (INDERAL LA) 80 MG 24 hr capsule Take 1 capsule (80 mg) by mouth daily 90 capsule 0    SUMAtriptan (IMITREX) 50 MG tablet Take 1 tablet (50 mg) by mouth at onset of headache for migraine May repeat in 2 hours. Max 8 tablets/24 hours. 15 tablet 0     Allergies   Allergen Reactions    Seasonal Allergies      Recent Labs   Lab Test 07/23/24  1620 02/16/24  1827   A1C 5.7*  --    *  --    HDL 35*  --    TRIG 470*  --    ALT 58 103*   CR 0.97 0.91   GFRESTIMATED >90 >90   POTASSIUM 3.9 3.8   TSH 1.37 1.72          Review of Systems  Constitutional, neuro, ENT, endocrine, pulmonary, cardiac, gastrointestinal, genitourinary, musculoskeletal, integument and psychiatric systems are negative, except as otherwise noted.     Objective    Exam  /74   Pulse 76   Temp 98.4  F (36.9  C) (Oral)   Resp 20   Wt 69.3 kg (152 lb 12.8 oz)   SpO2 97%   BMI 28.40 kg/m     Estimated body mass index is 28.4 kg/m  as calculated from the following:    Height as of 7/1/24: 1.562 m (5' 1.5\").    Weight as of this encounter: 69.3 kg (152 lb 12.8 " oz).    Physical Exam  GENERAL: alert and no distress  EYES: Eyes grossly normal to inspection, PERRL and conjunctivae and sclerae normal  HENT: ear canals and TM's normal, nose and mouth without ulcers or lesions  NECK: no adenopathy, no asymmetry, masses, or scars  RESP: lungs clear to auscultation - no rales, rhonchi or wheezes  CV: regular rate and rhythm, no murmur, click or rub, no peripheral edema  ABDOMEN: soft, nontender, no hepatosplenomegaly, no masses and bowel sounds normal  MS: no gross musculoskeletal defects noted, no edema  Neuro: Cn 2-12 intact with no focal neuro deficits   PSYCH: mentation appears normal, affect mildly anxious        Signed Electronically by: Donna Andrews DO

## 2024-07-24 DIAGNOSIS — E83.41 HYPERMAGNESEMIA: ICD-10-CM

## 2024-07-24 DIAGNOSIS — E78.1 HYPERTRIGLYCERIDEMIA: ICD-10-CM

## 2024-07-24 DIAGNOSIS — D69.6 THROMBOCYTOPENIA (H): Primary | ICD-10-CM

## 2024-07-31 ENCOUNTER — TELEPHONE (OUTPATIENT)
Dept: FAMILY MEDICINE | Facility: CLINIC | Age: 39
End: 2024-07-31
Payer: COMMERCIAL

## 2024-07-31 NOTE — TELEPHONE ENCOUNTER
----- Message from Donna Darryl sent at 7/31/2024 11:35 AM CDT -----  [employer] message not read. Please call x 2 and if no response, please send letter.        Cholesterol levels are quite high. The triglycerides ( fatty cells in the body) are especially high. This can put you at risk for heart.attacks/strokes in the future. I would like you to come back in 2 weeks and do a fasting lipid profile to establish a pattern. If the triglycerides are above 400 again, we may need to discuss starting meds.     Platelet count is low and mag  was a bit hit. These were most likely incidental. I will repeat there in 2 weeks as well     A1c (diabetes screening) is consistent with prediabetes.  This means that you do NOT  have diabetes but are at high risk for developing diabetes.  You are below threshold for medications at this time.  To help bring this number down and decrease chance of becoming diabetic-would decrease intake of simple carbohydrates (white rice, white bread, pasta, noodles, tortillas, sugary drinks (soda and creamer), potatoes) and try to substitute with complex carbohydrates instead.  Cutting down on late-night eating (last meal around 7 PM) can also help decrease insulin spikes.  Increasing exercise level to at least 30 minutes of aerobic exercise 3 times a week will also help bring this number down.  I would like to check this number again in 3 months to make sure that you have not progressed to diabetes.  I have put in a lab order.   You can make that appointment via [employer].

## 2024-07-31 NOTE — LETTER
August 1, 2024      Mick KAYODE Mansfield  [address]  [place] [address]        Dear ,    We are writing to inform you of your test results.    Cholesterol levels are quite high. The triglycerides ( fatty cells in the body) are especially high. This can put you at risk for heart.attacks/strokes in the future. I would like you to come back in 2 weeks and do a fasting lipid profile to establish a pattern. If the triglycerides are above 400 again, we may need to discuss starting meds.     Platelet count is low and mag  was a bit hit. These were most likely incidental. I will repeat there in 2 weeks as well     A1c (diabetes screening) is consistent with prediabetes.  This means that you do NOT  have diabetes but are at high risk for developing diabetes.  You are below threshold for medications at this time.  To help bring this number down and decrease chance of becoming diabetic-would decrease intake of simple carbohydrates (white rice, white bread, pasta, noodles, tortillas, sugary drinks (soda and creamer), potatoes) and try to substitute with complex carbohydrates instead.  Cutting down on late-night eating (last meal around 7 PM) can also help decrease insulin spikes.  Increasing exercise level to at least 30 minutes of aerobic exercise 3 times a week will also help bring this number down.  I would like to check this number again in 3 months to make sure that you have not progressed to diabetes.  I have put in a lab order.   You can make that appointment via [employer].    Resulted Orders   Lipid panel reflex to direct LDL Non-fasting   Result Value Ref Range    Cholesterol 251 (H) <200 mg/dL    Triglycerides 470 (H) <150 mg/dL    Direct Measure HDL 35 (L) >=40 mg/dL    LDL Cholesterol Calculated        Comment:      Cannot estimate LDL when triglyceride exceeds 400 mg/dL    Non HDL Cholesterol 216 (H) <130 mg/dL    Patient Fasting > 8hrs? No     Narrative    Cholesterol  Desirable:  <200  mg/dL    Triglycerides  Normal:  Less than 150 mg/dL  Borderline High:  150-199 mg/dL  High:  200-499 mg/dL  Very High:  Greater than or equal to 500 mg/dL    Direct Measure HDL  Female:  Greater than or equal to 50 mg/dL   Male:  Greater than or equal to 40 mg/dL    LDL Cholesterol  Desirable:  <100mg/dL  Above Desirable:  100-129 mg/dL   Borderline High:  130-159 mg/dL   High:  160-189 mg/dL   Very High:  >= 190 mg/dL    Non HDL Cholesterol  Desirable:  130 mg/dL  Above Desirable:  130-159 mg/dL  Borderline High:  160-189 mg/dL  High:  190-219 mg/dL  Very High:  Greater than or equal to 220 mg/dL   CBC with platelets   Result Value Ref Range    WBC Count 7.9 4.0 - 11.0 10e3/uL    RBC Count 5.40 4.40 - 5.90 10e6/uL    Hemoglobin 15.1 13.3 - 17.7 g/dL    Hematocrit 45.9 40.0 - 53.0 %    MCV 85 78 - 100 fL    MCH 28.0 26.5 - 33.0 pg    MCHC 32.9 31.5 - 36.5 g/dL    RDW 12.8 10.0 - 15.0 %    Platelet Count 148 (L) 150 - 450 10e3/uL   Comprehensive metabolic panel (BMP + Alb, Alk Phos, ALT, AST, Total. Bili, TP)   Result Value Ref Range    Sodium 140 135 - 145 mmol/L    Potassium 3.9 3.4 - 5.3 mmol/L    Carbon Dioxide (CO2) 27 22 - 29 mmol/L    Anion Gap 10 7 - 15 mmol/L    Urea Nitrogen 12.8 6.0 - 20.0 mg/dL    Creatinine 0.97 0.67 - 1.17 mg/dL    GFR Estimate >90 >60 mL/min/1.73m2      Comment:      eGFR calculated using 2021 CKD-EPI equation.    Calcium 9.4 8.8 - 10.4 mg/dL      Comment:      Reference intervals for this test were updated on 7/16/2024 to reflect our healthy population more accurately. There may be differences in the flagging of prior results with similar values performed with this method. Those prior results can be interpreted in the context of the updated reference intervals.    Chloride 103 98 - 107 mmol/L    Glucose 94 70 - 99 mg/dL    Alkaline Phosphatase 67 40 - 150 U/L    AST 38 0 - 45 U/L    ALT 58 0 - 70 U/L    Protein Total 7.5 6.4 - 8.3 g/dL    Albumin 4.5 3.5 - 5.2 g/dL    Bilirubin Total  0.2 <=1.2 mg/dL    Patient Fasting > 8hrs? No    Magnesium   Result Value Ref Range    Magnesium 2.4 (H) 1.7 - 2.3 mg/dL   TSH with free T4 reflex   Result Value Ref Range    TSH 1.37 0.30 - 4.20 uIU/mL   Hemoglobin A1c   Result Value Ref Range    Hemoglobin A1C 5.7 (H) 0.0 - 5.6 %      Comment:      Normal <5.7%   Prediabetes 5.7-6.4%    Diabetes 6.5% or higher     Note: Adopted from ADA consensus guidelines.   Vitamin D Deficiency   Result Value Ref Range    Vitamin D, Total (25-Hydroxy) 25 20 - 50 ng/mL      Comment:      optimum levels    Narrative    Season, race, dietary intake, and treatment affect the concentration of 25-hydroxy-Vitamin D. Values may decrease during winter months and increase during summer months.    Vitamin D determination is routinely performed by an immunoassay specific for 25 hydroxyvitamin D3.  If an individual is on vitamin D2(ergocalciferol) supplementation, please specify 25 OH vitamin D2 and D3 level determination by LCMSMS test VITD23.     Vitamin B12   Result Value Ref Range    Vitamin B12 853 232 - 1,245 pg/mL   LDL cholesterol direct   Result Value Ref Range    LDL Cholesterol Direct 162 (H) <100 mg/dL      Comment:      Age 2-19 years:  Desirable: 0-110 mg/dL   Borderline high: 110-129 mg/dL   High: >= 130 mg/dL    Age 20 years and older:  Desirable: <100mg/dL  Above desirable: 100-129 mg/dL   Borderline high: 130-159 mg/dL   High: 160-189 mg/dL   Very high: >= 190 mg/dL       If you have any questions or concerns, please call the clinic at the number listed above.       Sincerely,    Dr. Andrews's Care team

## 2024-08-01 NOTE — TELEPHONE ENCOUNTER
2nd attempt. LMTCB.     Result letter mailed.     If call is returned, please see and relay PCP's result message below and assist in scheduling lab only appt in 2 weeks.

## 2024-08-07 ENCOUNTER — TELEPHONE (OUTPATIENT)
Dept: FAMILY MEDICINE | Facility: CLINIC | Age: 39
End: 2024-08-07
Payer: COMMERCIAL

## 2024-08-07 NOTE — TELEPHONE ENCOUNTER
"[facility] regarding results message.     From 7/31/24 telephone encounter, please relay PCP's result message below and assist in scheduling lab only appt in 2 weeks.       \"Cholesterol levels are quite high. The triglycerides (fatty cells in the body) are especially high. This can put you at risk for heart.attacks/strokes in the future. I would like you to come back in 2 weeks and do a fasting lipid profile to establish a pattern. If the triglycerides are above 400 again, we may need to discuss starting meds.     Platelet count is low and mag  was a bit hit. These were most likely incidental. I will repeat there in 2 weeks as well     A1c (diabetes screening) is consistent with prediabetes.  This means that you do NOT  have diabetes but are at high risk for developing diabetes.  You are below threshold for medications at this time.  To help bring this number down and decrease chance of becoming diabetic-would decrease intake of simple carbohydrates (white rice, white bread, pasta, noodles, tortillas, sugary drinks (soda and creamer), potatoes) and try to substitute with complex carbohydrates instead.  Cutting down on late-night eating (last meal around 7 PM) can also help decrease insulin spikes.  Increasing exercise level to at least 30 minutes of aerobic exercise 3 times a week will also help bring this number down.  I would like to check this number again in 3 months to make sure that you have not progressed to diabetes.  I have put in a lab order.   You can make that appointment via [employer].\"  "

## 2024-08-07 NOTE — LETTER
August 8, 2024      Mick Mansfield  [address]  [place] [address]        Dear Mick,     We have attempted to reach you via phone and have had no response. Please call our office regarding your lab results and to schedule a lab appointment.       Sincerely,        Donna Andrews, DO

## 2024-08-07 NOTE — TELEPHONE ENCOUNTER
Patient Returning Call    Reason for call:  Patient stated someone called and left him a message from or for PCP     Information relayed to patient:  Nothing relayed due to no documentation found about this call     Patient has additional questions:  Yes    What are your questions/concerns:  Call back when information is available     Who does the patient want to speak with:   MA      Could we send this information to you in [place] or would you prefer to receive a phone call?:   Patient would prefer a phone call   Okay to leave a detailed message?: Yes at Home number on file [phone] (home)

## 2024-08-08 NOTE — TELEPHONE ENCOUNTER
Left message for patient to call back. When patient calls back please rely message below.     Multiple attempts - letter sent via mail

## 2024-08-13 ENCOUNTER — ANCILLARY PROCEDURE (OUTPATIENT)
Dept: MRI IMAGING | Facility: CLINIC | Age: 39
End: 2024-08-13
Attending: STUDENT IN AN ORGANIZED HEALTH CARE EDUCATION/TRAINING PROGRAM
Payer: COMMERCIAL

## 2024-08-13 DIAGNOSIS — R42 DIZZINESS: ICD-10-CM

## 2024-08-13 DIAGNOSIS — H53.8 BLURRED VISION: ICD-10-CM

## 2024-08-13 DIAGNOSIS — G43.111 INTRACTABLE MIGRAINE WITH AURA WITH STATUS MIGRAINOSUS: ICD-10-CM

## 2024-08-14 DIAGNOSIS — F07.81 POST CONCUSSION SYNDROME: Primary | ICD-10-CM

## 2024-08-21 ENCOUNTER — E-VISIT (OUTPATIENT)
Dept: FAMILY MEDICINE | Facility: CLINIC | Age: 39
End: 2024-08-21
Payer: COMMERCIAL

## 2024-08-21 DIAGNOSIS — R42 DIZZINESS: Primary | ICD-10-CM

## 2024-08-21 PROCEDURE — 99421 OL DIG E/M SVC 5-10 MIN: CPT | Performed by: STUDENT IN AN ORGANIZED HEALTH CARE EDUCATION/TRAINING PROGRAM

## 2025-06-16 ENCOUNTER — THERAPY VISIT (OUTPATIENT)
Dept: PHYSICAL THERAPY | Facility: REHABILITATION | Age: 40
End: 2025-06-16
Attending: STUDENT IN AN ORGANIZED HEALTH CARE EDUCATION/TRAINING PROGRAM
Payer: COMMERCIAL

## 2025-06-16 DIAGNOSIS — F07.81 POST CONCUSSION SYNDROME: ICD-10-CM

## 2025-06-16 PROCEDURE — 97161 PT EVAL LOW COMPLEX 20 MIN: CPT | Mod: GP | Performed by: PHYSICAL THERAPIST

## 2025-06-16 NOTE — PROGRESS NOTES
"PHYSICAL THERAPY EVALUATION  Type of Visit: Evaluation       Fall Risk Screen:  Have you fallen 2 or more times in the past year?: No  Have you fallen and had an injury in the past year?: No    Subjective         Presenting condition or subjective complaint: Post concussion    Mechanism of Injury:  About 2 years ago, Jan 2023: Pt was ice fishing and slipped and fell backwards, striking the back of the head. Dizziness began early 2024 and pt feels like his dizziness is related to the fall. 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 Pt is currently working FT.     Ongoing symptoms:  Lightheadedness, dizziness    Denies neck pain; has h/o migraines    Dizziness:   -Frequency:  Constantly   -Description of dizziness without using the word \"dizzy\":  Has difficulty explaining; like the back of the head is heavy, like I haven't slept for a long time. Pt does feel like he's getting enough sleep   -Exacerbating factors:  When he tries to drink or eat, swallowing makes him more dizzy (denies increased dizziness with running or jogging), bending over   -Relieving factors:  Resting/laying down   -Dizziness history:  Denies   -Current:  6/10, Best:  4/10, Worst:  10/10    Balance:   -When do you lose balance?:  Denies any issues with balance when running   -Recent falls:  Denies any    Date of onset: 08/14/24    Relevant medical history:     Dates & types of surgery:      Prior diagnostic imaging/testing results: MRI     Prior therapy history for the same diagnosis, illness or injury: No      Prior Level of Function  Transfers: Independent  Ambulation: Independent    Living Environment  Social support: With family members   Type of home: House   Stairs to enter the home: Yes 6 Is there a railing: Yes     Ramp: No   Stairs inside the home: Yes   Is there a railing: Yes     Help at home: " None  Equipment owned:       Employment: Yes    Hobbies/Interests:      Patient goals for therapy: Yes    Pain assessment: Pain denied     Objective   Cognitive Status Examination  Orientation: Oriented to person, place and time   Level of Consciousness: Alert    OBSERVATION: Accompanied by spouse  POSTURE: WNL  PALPATION: N/A  RANGE OF MOTION: LE ROM WNL  STRENGTH: LE Strength WNL    BED MOBILITY: Independent    TRANSFERS: Independent    GAIT:   Gait Description: Good pace, good form; no impairment    BALANCE:     SPECIAL TESTS  Functional Gait Assessment (FGA)    (<22/30 indicates fall risk)     Dynamic Gait Index (DGI)    (<19/24 indicates fall risk)   Modified CTSIB Conditions (sec) Cond 1: 30  Cond 2: 30  Cond 4: 30  Cond 5 : 30         SENSATION: LE Sensation WNL  COORDINATION: WNL    VESTIBULAR  Cervicogenic Screen    Neck ROM Normal     Oculomotor Screen    Ocular ROM Normal   Smooth Pursuit Normal   Saccades Normal   VOR Normal   VOR Cancellation Normal   Head Impulse Test Normal   Convergence Testing NT        Infrared Goggle Exam Vestibular Suppressant in Last 24 Hours? No  Exam Completed With: Infrared goggles   Spontaneous Nystagmus Negative   Gaze Evoked Nystagmus Negative   Head Shake Horizontal Nystagmus Negative   Supine Head-Hanging Test NT    Left Right   Geni-Hallpike Negative Negative   HSCC Supine Roll Test Negative Negative   HSCC Forward Roll Test NT NT      BPPV Canal(s): None  BPPV Type: None    Dynamic Visual Acuity (DVA)    Static Acuity (LogMar)    Horizontal Head Movement at 1 Hz (LogMar)    Horizontal Head Movement at 2 Hz (LogMar)               Assessment & Plan   CLINICAL IMPRESSIONS  Medical Diagnosis: Post Concussion Syndrome    Treatment Diagnosis: Post Concussion Syndrome   Impression/Assessment: Patient is a 39 year old male with vague dizziness complaints that began in the beginning of Jan 2024. Pt is not sure if it was related to a slip and fall on ice that occurred in Jan  2023. The following significant findings have been identified: Dizziness. Pt denies that his dizziness limits his work or recreational activities like running. Dizziness is described as constant and feeling like the head is heavy. PT jorden is unable to implicate the vestibular system as a source of his dizziness. Oculomotor testing and positional testing is WNL and pt is able to maintain normal balance in all conditions with the mCTSIB. At this time, skilled 1:1 PT is not warranted to address his complaints. PT recommends a possible follow up with ENT/Audiology for additional formal testing.     Clinical Decision Making (Complexity):  Clinical Presentation: Stable/Uncomplicated  Clinical Presentation Rationale: based on medical and personal factors listed in PT evaluation  Clinical Decision Making (Complexity): Low complexity    PLAN OF CARE  Treatment Interventions:  None    Long Term Goals      None      Frequency of Treatment: Eval only  Duration of Treatment: Eval only    Recommended Referrals to Other Professionals: ENT/Audiology  Education Assessment:        Risks and benefits of evaluation/treatment have been explained.   Patient/Family/caregiver agrees with Plan of Care.     Evaluation Time:     PT Eval, Low Complexity Minutes (42321): 40  Evaluation Only     Signing Clinician: Alda Yan, PT

## 2025-07-15 ENCOUNTER — HOSPITAL ENCOUNTER (OUTPATIENT)
Dept: CT IMAGING | Facility: HOSPITAL | Age: 40
Discharge: HOME | End: 2025-07-15
Attending: PHYSICIAN ASSISTANT
Payer: COMMERCIAL

## 2025-07-15 ENCOUNTER — OFFICE VISIT (OUTPATIENT)
Dept: URGENT CARE | Facility: URGENT CARE | Age: 40
End: 2025-07-15
Payer: COMMERCIAL

## 2025-07-15 VITALS
WEIGHT: 147.7 LBS | DIASTOLIC BLOOD PRESSURE: 79 MMHG | SYSTOLIC BLOOD PRESSURE: 112 MMHG | HEART RATE: 84 BPM | BODY MASS INDEX: 27.46 KG/M2 | OXYGEN SATURATION: 97 % | RESPIRATION RATE: 16 BRPM | TEMPERATURE: 98.1 F

## 2025-07-15 DIAGNOSIS — R51.9 OCCIPITAL PAIN: Primary | ICD-10-CM

## 2025-07-15 DIAGNOSIS — R51.9 ACUTE NONINTRACTABLE HEADACHE, UNSPECIFIED HEADACHE TYPE: ICD-10-CM

## 2025-07-15 DIAGNOSIS — R51.9 OCCIPITAL PAIN: ICD-10-CM

## 2025-07-15 PROCEDURE — 99214 OFFICE O/P EST MOD 30 MIN: CPT | Mod: 25 | Performed by: PHYSICIAN ASSISTANT

## 2025-07-15 PROCEDURE — 70481 CT ORBIT/EAR/FOSSA W/DYE: CPT

## 2025-07-15 PROCEDURE — 96372 THER/PROPH/DIAG INJ SC/IM: CPT | Performed by: PHYSICIAN ASSISTANT

## 2025-07-15 PROCEDURE — 3074F SYST BP LT 130 MM HG: CPT | Performed by: PHYSICIAN ASSISTANT

## 2025-07-15 PROCEDURE — 250N000009 HC RX 250: Performed by: PHYSICIAN ASSISTANT

## 2025-07-15 PROCEDURE — 3078F DIAST BP <80 MM HG: CPT | Performed by: PHYSICIAN ASSISTANT

## 2025-07-15 PROCEDURE — 250N000011 HC RX IP 250 OP 636: Performed by: PHYSICIAN ASSISTANT

## 2025-07-15 RX ORDER — IOPAMIDOL 755 MG/ML
67 INJECTION, SOLUTION INTRAVASCULAR ONCE
Status: COMPLETED | OUTPATIENT
Start: 2025-07-15 | End: 2025-07-15

## 2025-07-15 RX ORDER — KETOROLAC TROMETHAMINE 30 MG/ML
30 INJECTION, SOLUTION INTRAMUSCULAR; INTRAVENOUS ONCE
Status: COMPLETED | OUTPATIENT
Start: 2025-07-15 | End: 2025-07-15

## 2025-07-15 RX ORDER — BUTALBITAL, ACETAMINOPHEN AND CAFFEINE 50; 325; 40 MG/1; MG/1; MG/1
1 TABLET ORAL EVERY 6 HOURS PRN
Qty: 20 TABLET | Refills: 0 | Status: SHIPPED | OUTPATIENT
Start: 2025-07-15

## 2025-07-15 RX ORDER — BUTALBITAL, ACETAMINOPHEN AND CAFFEINE 50; 325; 40 MG/1; MG/1; MG/1
1 TABLET ORAL EVERY 4 HOURS PRN
Status: CANCELLED | OUTPATIENT
Start: 2025-07-15

## 2025-07-15 RX ADMIN — IOPAMIDOL 67 ML: 755 INJECTION, SOLUTION INTRAVENOUS at 15:40

## 2025-07-15 RX ADMIN — SODIUM CHLORIDE 100 ML: 9 INJECTION, SOLUTION INTRAVENOUS at 15:42

## 2025-07-15 RX ADMIN — KETOROLAC TROMETHAMINE 30 MG: 30 INJECTION, SOLUTION INTRAMUSCULAR; INTRAVENOUS at 14:31

## 2025-07-15 NOTE — PROGRESS NOTES
Urgent Care Clinic Visit    Chief Complaint   Patient presents with    Headache     Constant headache Rt back of head and has burning on and off last 2 week. Blurred vision for 1 year. Nausea.               7/15/2025     2:13 PM   Additional Questions   Roomed by Freedom Mendes MA   Accompanied by Wife         Freedom Mendes MA on 7/15/2025 at 2:14 PM

## 2025-07-15 NOTE — PROGRESS NOTES
[facility] [place] [address] [facility], [facility] [address]  Phone: [phone]  Fax: [phone]    Patient:  Mick Mansfield, Date of birth 1985  Date of Visit:  07/15/2025  Referring Provider No ref. provider found    Patient presents with:  Headache: Constant headache Rt back of head and has burning on and off last 2 week. Blurred vision for 1 year. Nausea. PRN meds not helping much.        ICD-10-CM    1. Occipital pain  R51.9 CT Temporal Bones w Contrast     butalbital-acetaminophen-caffeine (ESGIC) -40 MG tablet      2. Acute nonintractable headache, unspecified headache type  R51.9 ketorolac (TORADOL) injection 30 mg          There are no Patient Instructions on file for this visit.    Assessment & Plan      Assessment  - Differential diagnosis includes shingles, tension-type headache, and atypical migraine, occipital neuralgia.  - Shingles considered due to burning pain and one-sided headache, but lack of rash development makes it less likely.  - Tension-type headache considered due to muscle tension, though burning pain is atypical.  - Atypical migraine considered due to one-sided headache, differing from typical migraine presentation.  - Given distribution of pain occipital neuralgia remains high on this differential.  No neurologic deficits requiring immediate  advanced imaging.    Plan  - Administer Toradol injection in clinic to alleviate pain.  - Monitor patient for 15 minutes post-injection to assess response to treatment.           History of Present Illness     Pertinent history obtain from: patient    Mick Mansfield, 39-year-old male.    History of migraine and post-concussion syndrome. Attended physical therapy for post-concussion syndrome in June 2025, with no abnormalities found. Experienced a new type of headache starting on July 4, 2025, described as burning and poking pain on the right posterior side of the scalp, recurring every 10 seconds, with  associated scalp sensitivity. Headache is persistent, similar in severity to previous migraines but different in character. Reports fever at onset, which has since resolved. Noted nausea yesterday and this morning, without vomiting. He denied prior headaches of this type. No associated imbalance, weakness, or confusion. Reports bilateral ear buzzing x 1 year. Previous advanced imaging (MRI) performed last year after a fall, with normal results. Received Toradol injections for headache in July 2024 in both ER and clinic settings. He denies any current sensitivity to light or sound. He has taken Tylenol, Ibuprofen, Aleve, and Excedrin without improvement. No skin changes in the effected area that he can see.  His scalp feels tender to the touch in the affected area in his pain worsens when he rotates his head to the right.    Problem List:  2024-03: Epigastric pain  2024-03: Dizziness  2013-03: Hyperlipidemia with target LDL less than 130      No past medical history on file.    Social History     Tobacco Use    Smoking status: Every Day     Types: Cigarettes    Smokeless tobacco: Never   Substance Use Topics    Alcohol use: Yes     Comment: Occasionally       Physical Exam     Physical Exam  Vitals and nursing note reviewed.   Constitutional:       General: He is not in acute distress.     Appearance: He is not toxic-appearing or diaphoretic.   HENT:      Head: Normocephalic and atraumatic.      Comments: Location of pain in right lesser occipital nerve distribution.  Patient is tender to palpation and the pain is reproduced by rotation of the head to the right.      Right Ear: Tympanic membrane, ear canal and external ear normal.      Left Ear: Tympanic membrane and ear canal normal. There is mastoid tenderness.      Ears:      Comments: No swelling of the mastoid process.   Eyes:      Extraocular Movements: Extraocular movements intact.      Conjunctiva/sclera: Conjunctivae normal.      Pupils: Pupils are equal,  round, and reactive to light.   Cardiovascular:      Rate and Rhythm: Normal rate and regular rhythm.   Pulmonary:      Effort: Pulmonary effort is normal.   Musculoskeletal:      Cervical back: Normal range of motion. No rigidity.   Lymphadenopathy:      Cervical: No cervical adenopathy.   Skin:     Findings: No bruising, lesion or rash.   Neurological:      Mental Status: He is alert and oriented to person, place, and time.      GCS: GCS eye subscore is 4. GCS verbal subscore is 5. GCS motor subscore is 6.      Cranial Nerves: Cranial nerves 2-12 are intact. No cranial nerve deficit or facial asymmetry.      Motor: Motor function is intact. No weakness, tremor or seizure activity.      Coordination: Coordination is intact. Romberg sign negative. Finger-Nose-Finger Test normal.      Gait: Gait is intact. Gait normal.   Psychiatric:         Mood and Affect: Mood normal.         Behavior: Behavior normal.         Thought Content: Thought content normal.         Judgment: Judgment normal.         Vital signs:  /79   Pulse 84   Temp 98.1  F (36.7  C) (Oral)   Resp 16   Wt 67 kg (147 lb 11.2 oz)   SpO2 97%   BMI 27.46 kg/m        Data   Laboratory data and imaging listed below were reviewed as part of this encounter.     Results for orders placed or performed during the hospital encounter of 07/15/25   CT Temporal Bones w Contrast     Status: None    Narrative    EXAM: CT TEMPORAL W CONTRAST  LOCATION: [facility]  DATE: 7/15/2025    INDICATION: Severe pain in right greater occipital nerve distribution.  Rule out mastoiditis.  Main suspicion is occipital neuralgia.  COMPARISON: None.  CONTRAST: 67ml IV ISOVUE 370  TECHNIQUE:  Routine with IV contrast including thin section multiplanar reformatted images of each temporal bone. Dose reduction techniques were used.    FINDINGS:  Right IAC:  External auditory canal is unremarkable without evidence of atresia, stenosis or soft tissue.  Visualized tympanic membrane is intact and unremarkable. Scutum is sharp.  Epitympanum, mesotympanum and hypotympanum are free of soft tissue or fluid. Ossicles are intact and in normal alignment. Tegmen tympani is pneumatized and intact. Sinus tympani and facial recess are free of soft tissue or fluid. Oval and round windows   are patent.  2-1/2 turns to the cochlea. Vestibule and semicircular canals are unremarkable without evidence of dehiscence. Vestibular and cochlear aqueducts are of normal caliber. No evidence of labyrinthitis ossificans or otospongiosis. Internal auditory canal is   unremarkable. Labyrinthine, tympanic and mastoid segments of the facial nerve are unremarkable and follow a normal anatomic course. No dehiscence of the tympanic segment.  Carotid and jugular vascular channels are unremarkable and follow a normal anatomic course.  Perieustachian, perilabyrinthine, and mastoid air cells are pneumatized and entirely aerated.  Preauricular and postauricular spaces are unremarkable.    Left IAC:  External auditory canal is unremarkable without evidence of atresia, stenosis or soft tissue. Visualized tympanic membrane is intact and unremarkable. Scutum is sharp.  Epitympanum, mesotympanum and hypotympanum are free of soft tissue or fluid. Ossicles are intact and in normal alignment. Tegmen tympani is pneumatized and intact. Sinus tympani and facial recess are free of soft tissue or fluid. Oval and round windows   are patent.  2-1/2 turns to the cochlea. Vestibule and semicircular canals are unremarkable without evidence of dehiscence. Vestibular and cochlear aqueducts are of normal caliber. No evidence of labyrinthitis ossificans or otospongiosis. Internal auditory canal is   unremarkable. Labyrinthine, tympanic and mastoid segments of the facial nerve are unremarkable and follow a normal anatomic course. No dehiscence of the tympanic segment.  Carotid and jugular vascular channels are unremarkable  and follow a normal anatomic course.  Perieustachian, perilabyrinthine, and mastoid air cells are pneumatized and entirely aerated.  Preauricular and postauricular spaces are unremarkable.    Visualized intracranial contents, orbits, paranasal sinuses, nasopharynx is unremarkable.      Impression    IMPRESSION:    Right temporal bone:    1.  Unremarkable examination.    Left temporal bone:    1.  Unremarkable examination.                Consent was obtained from the patient to use an AI documentation tool in the creation of  this note

## 2025-08-23 ENCOUNTER — HEALTH MAINTENANCE LETTER (OUTPATIENT)
Age: 40
End: 2025-08-23